# Patient Record
Sex: MALE | Race: WHITE | NOT HISPANIC OR LATINO | Employment: UNEMPLOYED | URBAN - METROPOLITAN AREA
[De-identification: names, ages, dates, MRNs, and addresses within clinical notes are randomized per-mention and may not be internally consistent; named-entity substitution may affect disease eponyms.]

---
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
## 2023-01-19 ENCOUNTER — TELEPHONE (OUTPATIENT)
Dept: ONCOLOGY | Facility: CLINIC | Age: 18
End: 2023-01-19
Payer: COMMERCIAL

## 2023-01-19 NOTE — TELEPHONE ENCOUNTER
Caller: FARAZ MCCLAIN    Relationship to patient: [employer]    Best call back number: [phone]    Patient is needing: TO REQUEST INFORMATION FOR [facility] PROGRAM HE WAS REFERRED TO BY DR. CRUZ.

## 2023-01-19 NOTE — TELEPHONE ENCOUNTER
Reviewed patient chart and returned call to mother with the information that it is the HMR Program at [facility].   Phone number and extension number provided to mother.  She v/u.

## 2023-02-01 ENCOUNTER — OFFICE VISIT (OUTPATIENT)
Dept: ONCOLOGY | Facility: CLINIC | Age: 18
End: 2023-02-01
Payer: COMMERCIAL

## 2023-02-01 ENCOUNTER — LAB (OUTPATIENT)
Dept: LAB | Facility: HOSPITAL | Age: 18
End: 2023-02-01
Payer: COMMERCIAL

## 2023-02-01 VITALS
WEIGHT: 315 LBS | HEART RATE: 96 BPM | HEIGHT: 74 IN | OXYGEN SATURATION: 97 % | RESPIRATION RATE: 18 BRPM | DIASTOLIC BLOOD PRESSURE: 86 MMHG | TEMPERATURE: 98 F | BODY MASS INDEX: 40.43 KG/M2 | SYSTOLIC BLOOD PRESSURE: 132 MMHG

## 2023-02-01 DIAGNOSIS — I82.4Y2 DVT, LOWER EXTREMITY, PROXIMAL, ACUTE, LEFT: Primary | ICD-10-CM

## 2023-02-01 DIAGNOSIS — I26.99 BILATERAL PULMONARY EMBOLISM: ICD-10-CM

## 2023-02-01 DIAGNOSIS — I82.4Y2 DVT, LOWER EXTREMITY, PROXIMAL, ACUTE, LEFT: ICD-10-CM

## 2023-02-01 DIAGNOSIS — I82.4Z2 DVT, LOWER EXTREMITY, DISTAL, ACUTE, LEFT: ICD-10-CM

## 2023-02-01 LAB
ALBUMIN SERPL-MCNC: 4.4 G/DL (ref 3.5–5.2)
ALBUMIN/GLOB SERPL: 1.5 G/DL (ref 1.1–2.4)
ALP SERPL-CCNC: 64 U/L (ref 38–116)
ALT SERPL W P-5'-P-CCNC: 21 U/L (ref 0–41)
ANION GAP SERPL CALCULATED.3IONS-SCNC: 11.9 MMOL/L (ref 5–15)
AST SERPL-CCNC: 13 U/L (ref 0–40)
BASOPHILS # BLD AUTO: 0.04 10*3/MM3 (ref 0–0.2)
BASOPHILS NFR BLD AUTO: 0.5 % (ref 0–1.5)
BILIRUB SERPL-MCNC: 0.4 MG/DL (ref 0.2–1.2)
BUN SERPL-MCNC: 17 MG/DL (ref 6–20)
BUN/CREAT SERPL: 22.4 (ref 7.3–30)
CALCIUM SPEC-SCNC: 9.6 MG/DL (ref 8.5–10.2)
CHLORIDE SERPL-SCNC: 103 MMOL/L (ref 98–107)
CO2 SERPL-SCNC: 25.1 MMOL/L (ref 22–29)
CREAT SERPL-MCNC: 0.76 MG/DL (ref 0.7–1.3)
DEPRECATED RDW RBC AUTO: 42.1 FL (ref 37–54)
EGFRCR SERPLBLD CKD-EPI 2021: 132.8 ML/MIN/1.73
EOSINOPHIL # BLD AUTO: 0.16 10*3/MM3 (ref 0–0.4)
EOSINOPHIL NFR BLD AUTO: 2 % (ref 0.3–6.2)
ERYTHROCYTE [DISTWIDTH] IN BLOOD BY AUTOMATED COUNT: 13.4 % (ref 12.3–15.4)
GLOBULIN UR ELPH-MCNC: 3 GM/DL (ref 1.8–3.5)
GLUCOSE SERPL-MCNC: 90 MG/DL (ref 74–124)
HCT VFR BLD AUTO: 45.9 % (ref 37.5–51)
HGB BLD-MCNC: 14.5 G/DL (ref 13–17.7)
IMM GRANULOCYTES # BLD AUTO: 0.02 10*3/MM3 (ref 0–0.05)
IMM GRANULOCYTES NFR BLD AUTO: 0.2 % (ref 0–0.5)
INR PPP: 1.1 (ref 0.9–1.1)
LYMPHOCYTES # BLD AUTO: 3.56 10*3/MM3 (ref 0.7–3.1)
LYMPHOCYTES NFR BLD AUTO: 44.4 % (ref 19.6–45.3)
MCH RBC QN AUTO: 27.3 PG (ref 26.6–33)
MCHC RBC AUTO-ENTMCNC: 31.6 G/DL (ref 31.5–35.7)
MCV RBC AUTO: 86.3 FL (ref 79–97)
MONOCYTES # BLD AUTO: 0.71 10*3/MM3 (ref 0.1–0.9)
MONOCYTES NFR BLD AUTO: 8.9 % (ref 5–12)
NEUTROPHILS NFR BLD AUTO: 3.52 10*3/MM3 (ref 1.7–7)
NEUTROPHILS NFR BLD AUTO: 44 % (ref 42.7–76)
NRBC BLD AUTO-RTO: 0 /100 WBC (ref 0–0.2)
PLATELET # BLD AUTO: 326 10*3/MM3 (ref 140–450)
PMV BLD AUTO: 9.6 FL (ref 6–12)
POTASSIUM SERPL-SCNC: 4.3 MMOL/L (ref 3.5–4.7)
PROT SERPL-MCNC: 7.4 G/DL (ref 6.3–8)
PROTHROMBIN TIME: 13.5 SECONDS (ref 11–13.5)
RBC # BLD AUTO: 5.32 10*6/MM3 (ref 4.14–5.8)
SODIUM SERPL-SCNC: 140 MMOL/L (ref 134–145)
WBC NRBC COR # BLD: 8.01 10*3/MM3 (ref 3.4–10.8)

## 2023-02-01 PROCEDURE — 80053 COMPREHEN METABOLIC PANEL: CPT

## 2023-02-01 PROCEDURE — 85610 PROTHROMBIN TIME: CPT

## 2023-02-01 PROCEDURE — 99214 OFFICE O/P EST MOD 30 MIN: CPT | Performed by: NURSE PRACTITIONER

## 2023-02-01 PROCEDURE — 85025 COMPLETE CBC W/AUTO DIFF WBC: CPT

## 2023-02-01 PROCEDURE — 36415 COLL VENOUS BLD VENIPUNCTURE: CPT

## 2023-02-01 NOTE — PROGRESS NOTES
[facility]    CONSULTING IN BLOOD DISORDERS & CANCER      REASON FOR CONSULTATION/CHIEF COMPLAINT:     Evaluation and management for left leg DVT, IVC thrombus and bilateral pulmonary embolism                             REQUESTING PHYSICIAN: No ref. provider found  RECORDS OBTAINED:  Records of the patients history including those from the electronic medical record were reviewed and summarized in detail.    HISTORY OF PRESENT ILLNESS:    The patient is a 17 y.o. year old male  with no major medical history had presented to [facility] ER on 8/25/2022 with worsening left leg pain.  A Doppler in the ER noted extensive DVT tracking up the entire left lower extremity up to the IVC.  A CT angiogram of the chest/abdomen/pelvis/lower extremities noted small bilateral pulmonary thromboemboli, with normal RV/LV ratio.  The IVC inferior to the right renal vein filled with thrombus as are both common iliac veins, left internal and external iliac, common femoral, superficial and profunda femoral, and popliteal veins.  On the right, there is thrombus in the distal right common iliac vein.  There is also thrombus within the right superficial femoral and likely right popliteal vein.     Patient was started on anticoagulation with heparin drip and vascular surgery consulted.  Patient underwent bilateral lower extremity venous thrombectomy, IVC thrombectomy and venogram on 8/26/2022 by Dr. Juan.  Hematology has been consulted for further assistance in management.     Patient denies any personal or family history of thrombosis.  He is morbidly obese, BMI 44.7.  Says he usually has sedentary lifestyle.  Lives with his parents.  Spends most of the time sitting/playing video games.    Given his elevated BMI, plan made to start therapeutic anticoagulation with Coumadin as outpatient, goal INR 2-3.    Hypercoagulable work-up performed in August 2022: Negative for factor V Leiden mutation, prothrombin gene mutation.  Low  protein C and Antithrombin 3 level (likely acute thrombosis and anticoagulation related).  Normal protein S level.    December 2022: Having difficulty maintaining therapeutic INR.  Switched back to Xarelto.    INTERIM HISTORY:  Patient returns 2/1/2023 and sure interval follow-up having switched back to Xarelto in December secondary to inability to maintain at goal on Coumadin.  Patient denies any difficulty remembering his Xarelto however the pharmacy dispensed a second starter pack and he therefore has been taking 15 mg twice daily once again instead of the 20 mg once daily.  Patient states he thought that was incorrect however the pharmacy stated that is what was prescribed however the instructions were to start with the twice daily x21 days and then 20 mg once daily thereafter.  Aside from this, he denies any bleeding, dark stools, unilateral edema, or shortness of breath or pain with deep breath.  He did call about the HMR program at [facility] however found this to be too expensive.  He does have an upcoming appointment with his primary care and will discuss options for weight loss with her.  He is also adjusting his dietary intake, trading out some processed foods for more whole foods as we discussed today.  He has been helping his family with a renovation recently and has remained active with this.  He reports when he is not doing that, he does walk a couple of times per day.  He has not been using the compression stockings and we discussed the importance of this.    Past Medical History:   Diagnosis Date   • Asthma    • History of blood clots 2022    multiple clots of legs, groin, abd, lungs   • History of DVT (deep vein thrombosis) 2022   • History of pulmonary embolism 2022     Past Surgical History:   Procedure Laterality Date   • LYTIC THROMBIN THERAPY Bilateral 8/26/2022    Procedure: BILATERAL LOWER EXTREMITY VENOUS THROMBECTOMY, INFERIOR VENA CAVA THROMBECTOMY, VENOGRAM;  Surgeon: Ita Juan,  "MD;  Location: [facility] 18/19;  Service: Vascular;  Laterality: Bilateral;       MEDICATIONS    Current Outpatient Medications:   •  rivaroxaban (XARELTO) 20 MG tablet, Take 1 tablet by mouth Daily With Dinner., Disp: 90 tablet, Rfl: 1    ALLERGIES:   No Known Allergies    SOCIAL HISTORY:       Social History     Socioeconomic History   • Marital status: Single   Tobacco Use   • Smoking status: Never   • Smokeless tobacco: Never   Vaping Use   • Vaping Use: Never used   Substance and Sexual Activity   • Alcohol use: Never   • Drug use: Never   • Sexual activity: Defer         FAMILY HISTORY:  Family History   Problem Relation Age of Onset   • Hyperlipidemia Father        REVIEW OF SYSTEMS:  Constitutional: [No fevers, chills, sweats]  Eye: [No recent visual problems]  ENMT: [No ear pain, nasal congestion, sore throat]  Respiratory: [No shortness of breath, cough]  Cardiovascular: [No Chest pain, palpitations, syncope]  Gastrointestinal: [No nausea, vomiting, diarrhea]  Genitourinary: [No hematuria]  Hema/Lymph: [Negative for bruising tendency, swollen lymph glands]  Endocrine: [Negative for excessive thirst, excessive hunger]  Musculoskeletal: [Denies any musculoskeletal pain or swelling]  Integumentary: [No rash, pruritus, abrasions]  Neurologic: [ No weakness or numbness, Alert & oriented X 4]        Vitals:    02/01/23 0942   BP: 132/86   Pulse: 96   Resp: 18   Temp: 98 °F (36.7 °C)   TempSrc: Temporal   SpO2: 97%   Weight: (!) 160 kg (352 lb 1.6 oz)   Height: 188 cm (74.02\")   PainSc: 0-No pain     Current Status 2/1/2023   ECOG score 0      PHYSICAL EXAM:    CONSTITUTIONAL:  Vital signs reviewed.  No distress, looks comfortable.  EYES:  Conjunctiva and lids unremarkable.   EARS,NOSE,MOUTH,THROAT:  Ears and nose appear unremarkable.    RESPIRATORY:  Normal respiratory effort.  Lungs clear to auscultation bilaterally.  CARDIOVASCULAR:  Normal S1, S2.  No murmurs rubs or gallops.  No significant lower " extremity edema.  GASTROINTESTINAL: Abdomen appears unremarkable.  Obese abdomen LYMPHATIC:  No cervical, supraclavicular lymphadenopathy.  NEURO: AAOx3, no focal deficits.  Appears to have equal strength all 4 extremities.  MUSCULOSKELETAL:  Unremarkable digits/nails.  No cyanosis or clubbing.  No apparent joint deformities.  SKIN:  Warm.  No rashes  PSYCHIATRIC:  Normal judgment and insight.  Normal mood and affect.     RECENT LABS:        Lab on 02/01/2023   Component Date Value Ref Range Status   • Protime 02/01/2023 13.5  11.0 - 13.5 Seconds Final   • INR 02/01/2023 1.10  0.90 - 1.10 Final   • WBC 02/01/2023 8.01  3.40 - 10.80 10*3/mm3 Final   • RBC 02/01/2023 5.32  4.14 - 5.80 10*6/mm3 Final   • Hemoglobin 02/01/2023 14.5  13.0 - 17.7 g/dL Final   • Hematocrit 02/01/2023 45.9  37.5 - 51.0 % Final   • MCV 02/01/2023 86.3  79.0 - 97.0 fL Final   • MCH 02/01/2023 27.3  26.6 - 33.0 pg Final   • MCHC 02/01/2023 31.6  31.5 - 35.7 g/dL Final   • RDW 02/01/2023 13.4  12.3 - 15.4 % Final   • RDW-SD 02/01/2023 42.1  37.0 - 54.0 fl Final   • MPV 02/01/2023 9.6  6.0 - 12.0 fL Final   • Platelets 02/01/2023 326  140 - 450 10*3/mm3 Final   • Neutrophil % 02/01/2023 44.0  42.7 - 76.0 % Final   • Lymphocyte % 02/01/2023 44.4  19.6 - 45.3 % Final   • Monocyte % 02/01/2023 8.9  5.0 - 12.0 % Final   • Eosinophil % 02/01/2023 2.0  0.3 - 6.2 % Final   • Basophil % 02/01/2023 0.5  0.0 - 1.5 % Final   • Immature Grans % 02/01/2023 0.2  0.0 - 0.5 % Final   • Neutrophils, Absolute 02/01/2023 3.52  1.70 - 7.00 10*3/mm3 Final   • Lymphocytes, Absolute 02/01/2023 3.56 (H)  0.70 - 3.10 10*3/mm3 Final   • Monocytes, Absolute 02/01/2023 0.71  0.10 - 0.90 10*3/mm3 Final   • Eosinophils, Absolute 02/01/2023 0.16  0.00 - 0.40 10*3/mm3 Final   • Basophils, Absolute 02/01/2023 0.04  0.00 - 0.20 10*3/mm3 Final   • Immature Grans, Absolute 02/01/2023 0.02  0.00 - 0.05 10*3/mm3 Final   • nRBC 02/01/2023 0.0  0.0 - 0.2 /100 WBC Final        ASSESSMENT:  Mr. Elizabeth is a pleasant 17-year-old male.     # Bilateral lower extremity DVT, bilateral pulmonary embolism, pelvic veins and IVC thrombus:  · Patient had with presented with significant thrombosis involving lungs,  bilateral lower extremity, pelvic veins and IVC in end of August 2022.  He underwent bilateral lower extremity venous thrombectomy, IVC thrombectomy and venogram on 8/26/2022 by Dr. Juan.  This massive thrombus appears to be unprovoked, however certainly contributed by morbid obesity and sedentary lifestyle.  No personal or family history of thrombosis.   · A hypercoagulable work-up was negative for factor V Leiden mutation, prothrombin gene mutation and antiphospholipid syndrome.  Normal protein S level.  Low protein C and Antithrombin 3 levels (likely thrombus and anticoagulation related)  · Given his BMI, a DOAC was not considered an ideal option.  Patient was started on therapeutic anticoagulation with Coumadin.  Goal INR 2-3.  · 12/9/2022: Patient continues to have difficulty maintaining therapeutic INR.  Reviewed recent data from multiple meta-analysis which show comparable efficacy of DOAC's in obese patients as well.  (Comparative efectiveness and safety of direct oral anticoagulants compared to warfarin in morbidly obese patients with acute venous thromboembolism: systematic review and a meta-analysis, Journal of thrombosis and thrombolysis, June 2020; [place]-Analysis Comparing Direct Oral Anticoagulants Versus Warfarin in Morbidly Obese Patients With Atrial Fibrillation, American Journal of cardiology, 2020)  · Discussed plan to stop Coumadin and switch to Xarelto.  With his subtherapeutic INR, will plan to start Xarelto 15 mg twice daily x3 weeks followed by 20 mg daily.  · 2/1/2023 patient returns for short interval follow-up as he was switched to Xarelto at the last office visit due to difficulty with INR maintenance.  Patient reports continuing the Xarelto without any  interruption in the interim.  Unfortunately however his pharmacy dispensed a second starter pack so has been taking the 50 mg twice daily once again.  We discussed that he needs to be taking the 20 mg once daily instead and he had tried to correct his pharmacy on this however they did not call the office for clarification.  Anyhow, new prescription is sent to his pharmacy today and the patient will move to 20 mg once daily dosing.  He has had no signs of bleeding and no new edema.  We discussed maintaining routine physical activity and wearing compression stockings as he is not currently doing so.  The patient has gained some weight and reports that he did try calling HMR but does not find this financially feasible due to the cost.  He does have an upcoming appointment with his primary care however will discuss options and weight loss with them.  In the meantime he is focusing on changing some of his dietary choices such as swapping out processed foods such as chips for fruit or vegetables for snacks.  Patient will continue to do this and will call us for any new concerns with regards to Xarelto.  Otherwise we will see him back in 3 months.    #Bilateral lower extremity swelling: Likely lymphedema/thrombosis related.  Will prescribe compression stockings as per patient's request.  Advised to maintain close follow-up with vascular surgery/lymphedema clinic.    #Morbid obesity, BMI 44.7:  Patient's (Body mass index is 45.19 kg/m².) indicates that they are morbidly obese (BMI > 40 or > 35 with obesity - related health condition) with health related conditions that include none . Weight is improving with treatment. BMI is is above average; no BMI management plan is appropriate. We discussed portion control and increasing exercise.   Referred to [facility] weight loss program however this is not financially feasible for the patient.  He has been discussing with his mom making dietary changes such as swapping out  processed snacks like chips for more whole based foods such as fruits or vegetables.  He also has discussed rotating certain meals such as baked chicken and salad for dinner with sandwiches for lunch.  He states his family does order food in fairly frequently.  He does have an upcoming appointment with his primary care as well at which time he plans to discuss weight loss options.    PLAN:  -Massive DVT, PE and abdominal veins thrombosis.  Unprovoked.  Hypercoagulable work-up negative.  Will likely need long-term anticoagulation.  -New prescription for Xarelto 20 mg once daily sent to pharmacy, patient to transition to the 20 mg once daily dose  -Encouraged use of compression stockings  -Advised close follow-up with vascular surgery  -Patient to discuss weight loss options with primary care and make dietary adjustments on own  -Follow-up in 3 months with Dr. Cates or sooner if needed  No orders of the defined types were placed in this encounter.  Total time spent during this patient encounter is 45 minutes. The total time spent with the patient includes at least one or more of the following: preparing to see the patient by reviewing of tests, prior notes or other relevant information, performing appropriate independent examination & evaluation, counseling, ordering of medications, tests or procedures, communicating with other healthcare professionals, when appropriate to coordinate care, documenting clinic information in the electronic medical records or other health records, independently interpreting results of tests and communicating the results to the patient/family or caregiver.

## 2023-05-02 ENCOUNTER — LAB (OUTPATIENT)
Dept: LAB | Facility: HOSPITAL | Age: 18
End: 2023-05-02
Payer: COMMERCIAL

## 2023-05-02 ENCOUNTER — OFFICE VISIT (OUTPATIENT)
Dept: ONCOLOGY | Facility: CLINIC | Age: 18
End: 2023-05-02
Payer: COMMERCIAL

## 2023-05-02 VITALS
HEART RATE: 116 BPM | OXYGEN SATURATION: 96 % | WEIGHT: 315 LBS | DIASTOLIC BLOOD PRESSURE: 80 MMHG | HEIGHT: 74 IN | TEMPERATURE: 99.1 F | SYSTOLIC BLOOD PRESSURE: 123 MMHG | BODY MASS INDEX: 40.43 KG/M2

## 2023-05-02 DIAGNOSIS — I26.99 BILATERAL PULMONARY EMBOLISM: ICD-10-CM

## 2023-05-02 DIAGNOSIS — I82.4Y2 DVT, LOWER EXTREMITY, PROXIMAL, ACUTE, LEFT: ICD-10-CM

## 2023-05-02 DIAGNOSIS — I82.4Y2 DVT, LOWER EXTREMITY, PROXIMAL, ACUTE, LEFT: Primary | ICD-10-CM

## 2023-05-02 DIAGNOSIS — I82.4Z2 DVT, LOWER EXTREMITY, DISTAL, ACUTE, LEFT: ICD-10-CM

## 2023-05-02 LAB
BASOPHILS # BLD AUTO: 0.05 10*3/MM3 (ref 0–0.2)
BASOPHILS NFR BLD AUTO: 0.5 % (ref 0–1.5)
DEPRECATED RDW RBC AUTO: 38 FL (ref 37–54)
EOSINOPHIL # BLD AUTO: 0.17 10*3/MM3 (ref 0–0.4)
EOSINOPHIL NFR BLD AUTO: 1.9 % (ref 0.3–6.2)
ERYTHROCYTE [DISTWIDTH] IN BLOOD BY AUTOMATED COUNT: 12.5 % (ref 12.3–15.4)
HCT VFR BLD AUTO: 46.2 % (ref 37.5–51)
HGB BLD-MCNC: 15.8 G/DL (ref 13–17.7)
IMM GRANULOCYTES # BLD AUTO: 0.04 10*3/MM3 (ref 0–0.05)
IMM GRANULOCYTES NFR BLD AUTO: 0.4 % (ref 0–0.5)
INR PPP: 1.1 (ref 0.9–1.1)
LYMPHOCYTES # BLD AUTO: 3.71 10*3/MM3 (ref 0.7–3.1)
LYMPHOCYTES NFR BLD AUTO: 40.4 % (ref 19.6–45.3)
MCH RBC QN AUTO: 28.8 PG (ref 26.6–33)
MCHC RBC AUTO-ENTMCNC: 34.2 G/DL (ref 31.5–35.7)
MCV RBC AUTO: 84.2 FL (ref 79–97)
MONOCYTES # BLD AUTO: 0.71 10*3/MM3 (ref 0.1–0.9)
MONOCYTES NFR BLD AUTO: 7.7 % (ref 5–12)
NEUTROPHILS NFR BLD AUTO: 4.5 10*3/MM3 (ref 1.7–7)
NEUTROPHILS NFR BLD AUTO: 49.1 % (ref 42.7–76)
NRBC BLD AUTO-RTO: 0 /100 WBC (ref 0–0.2)
PLATELET # BLD AUTO: 313 10*3/MM3 (ref 140–450)
PMV BLD AUTO: 9.8 FL (ref 6–12)
PROTHROMBIN TIME: 13 SECONDS (ref 11–13.5)
RBC # BLD AUTO: 5.49 10*6/MM3 (ref 4.14–5.8)
WBC NRBC COR # BLD: 9.18 10*3/MM3 (ref 3.4–10.8)

## 2023-05-02 PROCEDURE — 85025 COMPLETE CBC W/AUTO DIFF WBC: CPT

## 2023-05-02 PROCEDURE — 36416 COLLJ CAPILLARY BLOOD SPEC: CPT

## 2023-05-02 PROCEDURE — 85610 PROTHROMBIN TIME: CPT

## 2023-05-02 NOTE — PROGRESS NOTES
[facility]    CONSULTING IN BLOOD DISORDERS & CANCER      REASON FOR CONSULTATION/CHIEF COMPLAINT:     Evaluation and management for left leg DVT, IVC thrombus and bilateral pulmonary embolism                             REQUESTING PHYSICIAN: No ref. provider found  RECORDS OBTAINED:  Records of the patients history including those from the electronic medical record were reviewed and summarized in detail.    HISTORY OF PRESENT ILLNESS:    The patient is a 18 y.o. year old male  with no major medical history had presented to [facility] ER on 8/25/2022 with worsening left leg pain.  A Doppler in the ER noted extensive DVT tracking up the entire left lower extremity up to the IVC.  A CT angiogram of the chest/abdomen/pelvis/lower extremities noted small bilateral pulmonary thromboemboli, with normal RV/LV ratio.  The IVC inferior to the right renal vein filled with thrombus as are both common iliac veins, left internal and external iliac, common femoral, superficial and profunda femoral, and popliteal veins.  On the right, there is thrombus in the distal right common iliac vein.  There is also thrombus within the right superficial femoral and likely right popliteal vein.     Patient was started on anticoagulation with heparin drip and vascular surgery consulted.  Patient underwent bilateral lower extremity venous thrombectomy, IVC thrombectomy and venogram on 8/26/2022 by Dr. Juan.  Hematology has been consulted for further assistance in management.     Patient denies any personal or family history of thrombosis.  He is morbidly obese, BMI 44.7.  Says he usually has sedentary lifestyle.  Lives with his parents.  Spends most of the time sitting/playing video games.    Given his elevated BMI, plan made to start therapeutic anticoagulation with Coumadin as outpatient, goal INR 2-3.    Hypercoagulable work-up performed in August 2022: Negative for factor V Leiden mutation, prothrombin gene mutation.  Low  protein C and Antithrombin 3 level (likely acute thrombosis and anticoagulation related).  Normal protein S level.    December 2022: Having difficulty maintaining therapeutic INR.  Switched back to Xarelto.    INTERIM HISTORY:  Patient returns to the clinic for a follow-up visit.  He remains on Xarelto 20 mg daily.  Tolerating it well.  No excessive bruises or bleeding.  Weight overall stable.  He did call about the HMR program at [facility] however found this to be too expensive.  He he has an upcoming appointment with endocrinology to discuss weight loss management.  He does walk a couple of times per day.  He has not been using the compression stockings and we discussed the importance of this.    Past Medical History:   Diagnosis Date   • Asthma    • History of blood clots 2022    multiple clots of legs, groin, abd, lungs   • History of DVT (deep vein thrombosis) 2022   • History of pulmonary embolism 2022     Past Surgical History:   Procedure Laterality Date   • LYTIC THROMBIN THERAPY Bilateral 8/26/2022    Procedure: BILATERAL LOWER EXTREMITY VENOUS THROMBECTOMY, INFERIOR VENA CAVA THROMBECTOMY, VENOGRAM;  Surgeon: Ita Juan MD;  Location: [facility] 18/19;  Service: Vascular;  Laterality: Bilateral;       MEDICATIONS    Current Outpatient Medications:   •  rivaroxaban (XARELTO) 20 MG tablet, Take 1 tablet by mouth Daily With Dinner., Disp: 90 tablet, Rfl: 1    ALLERGIES:   No Known Allergies    SOCIAL HISTORY:       Social History     Socioeconomic History   • Marital status: Single   Tobacco Use   • Smoking status: Never   • Smokeless tobacco: Never   Vaping Use   • Vaping Use: Never used   Substance and Sexual Activity   • Alcohol use: Never   • Drug use: Never   • Sexual activity: Defer         FAMILY HISTORY:  Family History   Problem Relation Age of Onset   • Hyperlipidemia Father        REVIEW OF SYSTEMS:  As per HPI        Vitals:    05/02/23 1330   BP: 123/80   Pulse: 116   Temp: 99.1  "°F (37.3 °C)   TempSrc: Temporal   SpO2: 96%   Weight: (!) 160 kg (353 lb)   Height: 188 cm (74.02\")   PainSc: 0-No pain         5/2/2023     1:29 PM   Current Status   ECOG score 0      PHYSICAL EXAM:    CONSTITUTIONAL:  Vital signs reviewed.  No distress, looks comfortable.  EYES:  Conjunctiva and lids unremarkable.   EARS,NOSE,MOUTH,THROAT:  Ears and nose appear unremarkable.    RESPIRATORY:  Normal respiratory effort.  Lungs clear to auscultation bilaterally.  CARDIOVASCULAR:  Normal S1, S2.  No murmurs rubs or gallops.  No significant lower extremity edema.  GASTROINTESTINAL: Abdomen appears unremarkable.  Obese abdomen  LYMPHATIC:  No cervical, supraclavicular lymphadenopathy.  NEURO: AAOx3, no focal deficits.  Appears to have equal strength all 4 extremities.  MUSCULOSKELETAL:  Unremarkable digits/nails.  No cyanosis or clubbing.  No apparent joint deformities.  SKIN:  Warm.  No rashes  PSYCHIATRIC:  Normal judgment and insight.  Normal mood and affect.     RECENT LABS:        Lab on 05/02/2023   Component Date Value Ref Range Status   • Protime 05/02/2023 13.0  11.0 - 13.5 Seconds Final   • INR 05/02/2023 1.10  0.90 - 1.10 Final   • WBC 05/02/2023 9.18  3.40 - 10.80 10*3/mm3 Final   • RBC 05/02/2023 5.49  4.14 - 5.80 10*6/mm3 Final   • Hemoglobin 05/02/2023 15.8  13.0 - 17.7 g/dL Final   • Hematocrit 05/02/2023 46.2  37.5 - 51.0 % Final   • MCV 05/02/2023 84.2  79.0 - 97.0 fL Final   • MCH 05/02/2023 28.8  26.6 - 33.0 pg Final   • MCHC 05/02/2023 34.2  31.5 - 35.7 g/dL Final   • RDW 05/02/2023 12.5  12.3 - 15.4 % Final   • RDW-SD 05/02/2023 38.0  37.0 - 54.0 fl Final   • MPV 05/02/2023 9.8  6.0 - 12.0 fL Final   • Platelets 05/02/2023 313  140 - 450 10*3/mm3 Final   • Neutrophil % 05/02/2023 49.1  42.7 - 76.0 % Final   • Lymphocyte % 05/02/2023 40.4  19.6 - 45.3 % Final   • Monocyte % 05/02/2023 7.7  5.0 - 12.0 % Final   • Eosinophil % 05/02/2023 1.9  0.3 - 6.2 % Final   • Basophil % 05/02/2023 0.5  0.0 - " 1.5 % Final   • Immature Grans % 05/02/2023 0.4  0.0 - 0.5 % Final   • Neutrophils, Absolute 05/02/2023 4.50  1.70 - 7.00 10*3/mm3 Final   • Lymphocytes, Absolute 05/02/2023 3.71 (H)  0.70 - 3.10 10*3/mm3 Final   • Monocytes, Absolute 05/02/2023 0.71  0.10 - 0.90 10*3/mm3 Final   • Eosinophils, Absolute 05/02/2023 0.17  0.00 - 0.40 10*3/mm3 Final   • Basophils, Absolute 05/02/2023 0.05  0.00 - 0.20 10*3/mm3 Final   • Immature Grans, Absolute 05/02/2023 0.04  0.00 - 0.05 10*3/mm3 Final   • nRBC 05/02/2023 0.0  0.0 - 0.2 /100 WBC Final       ASSESSMENT:  Mr. Elizabeth is a pleasant 17-year-old male.     # Bilateral lower extremity DVT, bilateral pulmonary embolism, pelvic veins and IVC thrombus:  · Patient had with presented with significant thrombosis involving lungs,  bilateral lower extremity, pelvic veins and IVC in end of August 2022.  He underwent bilateral lower extremity venous thrombectomy, IVC thrombectomy and venogram on 8/26/2022 by Dr. Juan.  This massive thrombus appears to be unprovoked, however certainly contributed by morbid obesity and sedentary lifestyle.  No personal or family history of thrombosis.   · A hypercoagulable work-up was negative for factor V Leiden mutation, prothrombin gene mutation and antiphospholipid syndrome.  Normal protein S level.  Low protein C and Antithrombin 3 levels (likely thrombus and anticoagulation related)  · Given his BMI, a DOAC was not considered an ideal option.  Patient was started on therapeutic anticoagulation with Coumadin.  Goal INR 2-3.  · 12/9/2022: Patient continues to have difficulty maintaining therapeutic INR.  Reviewed recent data from multiple meta-analysis which show comparable efficacy of DOAC's in obese patients as well.  (Comparative efectiveness and safety of direct oral anticoagulants compared to warfarin in morbidly obese patients with acute venous thromboembolism: systematic review and a meta-analysis, Journal of thrombosis and thrombolysis,  June 2020; [place]-Analysis Comparing Direct Oral Anticoagulants Versus Warfarin in Morbidly Obese Patients With Atrial Fibrillation, American Journal of cardiology, 2020)  · Discussed plan to stop Coumadin and switch to Xarelto (Dec 2022)  · 5/2/2023: Continues to do well on Xarelto 20 mg daily.  No bleed or excessive bruises.  Labs stable.  Patient is working on reducing his weight.  Says he has an upcoming appointment with endocrinology next week.  Discussed plan to repeat bilateral lower extremity Doppler in 3-4 months and follow-up after that.    #Bilateral lower extremity swelling: Likely lymphedema/thrombosis related.  Recommended compression stockings.  Advised to maintain close follow-up with vascular surgery/lymphedema clinic.    #Morbid obesity, BMI 44.7:  Patient's (Body mass index is 45.3 kg/m².) indicates that they are morbidly obese (BMI > 40 or > 35 with obesity - related health condition) with health related conditions that include lower extremity venous stasis disease . Weight is unchanged. BMI is is above average; no BMI management plan is appropriate. We discussed portion control and increasing exercise.   He has an upcoming appointment with endocrinology to discuss weight loss medications.    PLAN:  -Massive DVT, PE and abdominal veins thrombosis.  Unprovoked.  Hypercoagulable work-up negative.  Will likely need long-term anticoagulation.  -Continue Xarelto 20 mg daily  -Encouraged use of compression stockings  -Advised close follow-up with vascular surgery  -Repeat bilateral lower extremity Dopplers in 3-4 months  -Follow-up in 3 months  or sooner if needed  Orders Placed This Encounter   Procedures   • CBC & Differential     Standing Status:   Future     Number of Occurrences:   1     Standing Expiration Date:   5/1/2024     Order Specific Question:   Manual Differential     Answer:   No   Total time spent during this patient encounter is 45 minutes. The total time spent with the patient includes  at least one or more of the following: preparing to see the patient by reviewing of tests, prior notes or other relevant information, performing appropriate independent examination & evaluation, counseling, ordering of medications, tests or procedures, communicating with other healthcare professionals, when appropriate to coordinate care, documenting clinic information in the electronic medical records or other health records, independently interpreting results of tests and communicating the results to the patient/family or caregiver.

## 2023-09-05 ENCOUNTER — HOSPITAL ENCOUNTER (OUTPATIENT)
Dept: CARDIOLOGY | Facility: HOSPITAL | Age: 18
Discharge: HOME | End: 2023-09-05
Admitting: INTERNAL MEDICINE
Payer: COMMERCIAL

## 2023-09-05 DIAGNOSIS — I82.4Y2 DVT, LOWER EXTREMITY, PROXIMAL, ACUTE, LEFT: ICD-10-CM

## 2023-09-05 LAB
BH CV LOW VAS LEFT COMMON FEMORAL SPONT: 1
BH CV LOW VAS LEFT MID FEMORAL SPONT: 1
BH CV LOW VAS LEFT POPLITEAL SPONT: 1
BH CV LOWER VASCULAR LEFT COMMON FEMORAL AUGMENT: NORMAL
BH CV LOWER VASCULAR LEFT COMMON FEMORAL COMPETENT: NORMAL
BH CV LOWER VASCULAR LEFT COMMON FEMORAL COMPRESS: NORMAL
BH CV LOWER VASCULAR LEFT COMMON FEMORAL PHASIC: NORMAL
BH CV LOWER VASCULAR LEFT COMMON FEMORAL SPONT: NORMAL
BH CV LOWER VASCULAR LEFT COMMON FEMORAL THROMBUS: NORMAL
BH CV LOWER VASCULAR LEFT DISTAL FEMORAL COMPRESS: NORMAL
BH CV LOWER VASCULAR LEFT GASTRONEMIUS COMPRESS: NORMAL
BH CV LOWER VASCULAR LEFT GREATER SAPH AK COMPRESS: NORMAL
BH CV LOWER VASCULAR LEFT GREATER SAPH BK COMPRESS: NORMAL
BH CV LOWER VASCULAR LEFT LESSER SAPH COMPRESS: NORMAL
BH CV LOWER VASCULAR LEFT MID FEMORAL AUGMENT: NORMAL
BH CV LOWER VASCULAR LEFT MID FEMORAL COMPETENT: NORMAL
BH CV LOWER VASCULAR LEFT MID FEMORAL COMPRESS: NORMAL
BH CV LOWER VASCULAR LEFT MID FEMORAL PHASIC: NORMAL
BH CV LOWER VASCULAR LEFT MID FEMORAL SPONT: NORMAL
BH CV LOWER VASCULAR LEFT MID FEMORAL THROMBUS: NORMAL
BH CV LOWER VASCULAR LEFT PERONEAL COMPRESS: NORMAL
BH CV LOWER VASCULAR LEFT POPLITEAL AUGMENT: NORMAL
BH CV LOWER VASCULAR LEFT POPLITEAL COMPETENT: NORMAL
BH CV LOWER VASCULAR LEFT POPLITEAL COMPRESS: NORMAL
BH CV LOWER VASCULAR LEFT POPLITEAL PHASIC: NORMAL
BH CV LOWER VASCULAR LEFT POPLITEAL SPONT: NORMAL
BH CV LOWER VASCULAR LEFT POPLITEAL THROMBUS: NORMAL
BH CV LOWER VASCULAR LEFT POSTERIOR TIBIAL COMPRESS: NORMAL
BH CV LOWER VASCULAR LEFT PROFUNDA FEMORAL COMPRESS: NORMAL
BH CV LOWER VASCULAR LEFT PROXIMAL FEMORAL COMPRESS: NORMAL
BH CV LOWER VASCULAR LEFT SAPHENOFEMORAL JUNCTION COMPRESS: NORMAL
BH CV LOWER VASCULAR RIGHT COMMON FEMORAL AUGMENT: NORMAL
BH CV LOWER VASCULAR RIGHT COMMON FEMORAL COMPETENT: NORMAL
BH CV LOWER VASCULAR RIGHT COMMON FEMORAL COMPRESS: NORMAL
BH CV LOWER VASCULAR RIGHT COMMON FEMORAL PHASIC: NORMAL
BH CV LOWER VASCULAR RIGHT COMMON FEMORAL SPONT: NORMAL
BH CV LOWER VASCULAR RIGHT DISTAL FEMORAL COMPRESS: NORMAL
BH CV LOWER VASCULAR RIGHT GASTRONEMIUS COMPRESS: NORMAL
BH CV LOWER VASCULAR RIGHT GREATER SAPH AK COMPRESS: NORMAL
BH CV LOWER VASCULAR RIGHT GREATER SAPH BK COMPRESS: NORMAL
BH CV LOWER VASCULAR RIGHT LESSER SAPH COMPRESS: NORMAL
BH CV LOWER VASCULAR RIGHT MID FEMORAL AUGMENT: NORMAL
BH CV LOWER VASCULAR RIGHT MID FEMORAL COMPETENT: NORMAL
BH CV LOWER VASCULAR RIGHT MID FEMORAL COMPRESS: NORMAL
BH CV LOWER VASCULAR RIGHT MID FEMORAL PHASIC: NORMAL
BH CV LOWER VASCULAR RIGHT MID FEMORAL SPONT: NORMAL
BH CV LOWER VASCULAR RIGHT PERONEAL COMPRESS: NORMAL
BH CV LOWER VASCULAR RIGHT POPLITEAL AUGMENT: NORMAL
BH CV LOWER VASCULAR RIGHT POPLITEAL COMPETENT: NORMAL
BH CV LOWER VASCULAR RIGHT POPLITEAL COMPRESS: NORMAL
BH CV LOWER VASCULAR RIGHT POPLITEAL PHASIC: NORMAL
BH CV LOWER VASCULAR RIGHT POPLITEAL SPONT: NORMAL
BH CV LOWER VASCULAR RIGHT POSTERIOR TIBIAL COMPRESS: NORMAL
BH CV LOWER VASCULAR RIGHT PROFUNDA FEMORAL COMPRESS: NORMAL
BH CV LOWER VASCULAR RIGHT PROXIMAL FEMORAL COMPRESS: NORMAL
BH CV LOWER VASCULAR RIGHT SAPHENOFEMORAL JUNCTION COMPRESS: NORMAL

## 2023-09-05 PROCEDURE — 93970 EXTREMITY STUDY: CPT

## 2023-09-08 ENCOUNTER — LAB (OUTPATIENT)
Dept: LAB | Facility: HOSPITAL | Age: 18
End: 2023-09-08
Payer: COMMERCIAL

## 2023-09-08 ENCOUNTER — OFFICE VISIT (OUTPATIENT)
Dept: ONCOLOGY | Facility: CLINIC | Age: 18
End: 2023-09-08
Payer: COMMERCIAL

## 2023-09-08 VITALS
HEIGHT: 74 IN | SYSTOLIC BLOOD PRESSURE: 123 MMHG | TEMPERATURE: 98.2 F | WEIGHT: 315 LBS | OXYGEN SATURATION: 98 % | DIASTOLIC BLOOD PRESSURE: 82 MMHG | RESPIRATION RATE: 16 BRPM | HEART RATE: 88 BPM | BODY MASS INDEX: 40.43 KG/M2

## 2023-09-08 DIAGNOSIS — I26.99 BILATERAL PULMONARY EMBOLISM: ICD-10-CM

## 2023-09-08 DIAGNOSIS — I82.4Y2 DVT, LOWER EXTREMITY, PROXIMAL, ACUTE, LEFT: Primary | ICD-10-CM

## 2023-09-08 DIAGNOSIS — I82.4Y2 DVT, LOWER EXTREMITY, PROXIMAL, ACUTE, LEFT: ICD-10-CM

## 2023-09-08 LAB
BASOPHILS # BLD AUTO: 0.04 10*3/MM3 (ref 0–0.2)
BASOPHILS NFR BLD AUTO: 0.5 % (ref 0–1.5)
DEPRECATED RDW RBC AUTO: 38.8 FL (ref 37–54)
EOSINOPHIL # BLD AUTO: 0.1 10*3/MM3 (ref 0–0.4)
EOSINOPHIL NFR BLD AUTO: 1.4 % (ref 0.3–6.2)
ERYTHROCYTE [DISTWIDTH] IN BLOOD BY AUTOMATED COUNT: 12.8 % (ref 12.3–15.4)
HCT VFR BLD AUTO: 45 % (ref 37.5–51)
HGB BLD-MCNC: 15.3 G/DL (ref 13–17.7)
IMM GRANULOCYTES # BLD AUTO: 0.11 10*3/MM3 (ref 0–0.05)
IMM GRANULOCYTES NFR BLD AUTO: 1.5 % (ref 0–0.5)
LYMPHOCYTES # BLD AUTO: 2.32 10*3/MM3 (ref 0.7–3.1)
LYMPHOCYTES NFR BLD AUTO: 31.7 % (ref 19.6–45.3)
MCH RBC QN AUTO: 28.8 PG (ref 26.6–33)
MCHC RBC AUTO-ENTMCNC: 34 G/DL (ref 31.5–35.7)
MCV RBC AUTO: 84.6 FL (ref 79–97)
MONOCYTES # BLD AUTO: 0.5 10*3/MM3 (ref 0.1–0.9)
MONOCYTES NFR BLD AUTO: 6.8 % (ref 5–12)
NEUTROPHILS NFR BLD AUTO: 4.24 10*3/MM3 (ref 1.7–7)
NEUTROPHILS NFR BLD AUTO: 58.1 % (ref 42.7–76)
NRBC BLD AUTO-RTO: 0 /100 WBC (ref 0–0.2)
PLATELET # BLD AUTO: 289 10*3/MM3 (ref 140–450)
PMV BLD AUTO: 9.7 FL (ref 6–12)
RBC # BLD AUTO: 5.32 10*6/MM3 (ref 4.14–5.8)
WBC NRBC COR # BLD: 7.31 10*3/MM3 (ref 3.4–10.8)

## 2023-09-08 PROCEDURE — 85025 COMPLETE CBC W/AUTO DIFF WBC: CPT

## 2023-09-08 PROCEDURE — 36415 COLL VENOUS BLD VENIPUNCTURE: CPT

## 2023-09-08 NOTE — PROGRESS NOTES
[facility]    CONSULTING IN BLOOD DISORDERS & CANCER      REASON FOR CONSULTATION/CHIEF COMPLAINT:     Evaluation and management for left leg DVT, IVC thrombus and bilateral pulmonary embolism                             REQUESTING PHYSICIAN: No ref. provider found  RECORDS OBTAINED:  Records of the patients history including those from the electronic medical record were reviewed and summarized in detail.    HISTORY OF PRESENT ILLNESS:    The patient is a 18 y.o. year old male  with no major medical history had presented to [facility] ER on 8/25/2022 with worsening left leg pain.  A Doppler in the ER noted extensive DVT tracking up the entire left lower extremity up to the IVC.  A CT angiogram of the chest/abdomen/pelvis/lower extremities noted small bilateral pulmonary thromboemboli, with normal RV/LV ratio.  The IVC inferior to the right renal vein filled with thrombus as are both common iliac veins, left internal and external iliac, common femoral, superficial and profunda femoral, and popliteal veins.  On the right, there is thrombus in the distal right common iliac vein.  There is also thrombus within the right superficial femoral and likely right popliteal vein.     Patient was started on anticoagulation with heparin drip and vascular surgery consulted.  Patient underwent bilateral lower extremity venous thrombectomy, IVC thrombectomy and venogram on 8/26/2022 by Dr. Juan.  Hematology has been consulted for further assistance in management.     Patient denies any personal or family history of thrombosis.  He is morbidly obese, BMI 44.7.  Says he usually has sedentary lifestyle.  Lives with his parents.  Spends most of the time sitting/playing video games.    Given his elevated BMI, plan made to start therapeutic anticoagulation with Coumadin as outpatient, goal INR 2-3.    Hypercoagulable work-up performed in August 2022: Negative for factor V Leiden mutation, prothrombin gene mutation.  Low  protein C and Antithrombin 3 level (likely acute thrombosis and anticoagulation related).  Normal protein S level.    December 2022: Having difficulty maintaining therapeutic INR.  Switched back to Xarelto.    INTERIM HISTORY:  Patient returns to the clinic for a follow-up visit.  He remains on Xarelto 20 mg daily.  Tolerating it well.  No excessive bruises or bleeding.  Weight overall stable.  He has not been able to see endocrinology.  He does walk a couple of times per day.  Reports of having tightness and swelling in right lower extremity around ankles with excessive walking.  He has not been using the compression stockings and we discussed the importance of this.    Past Medical History:   Diagnosis Date    Asthma     History of blood clots 2022    multiple clots of legs, groin, abd, lungs    History of DVT (deep vein thrombosis) 2022    History of pulmonary embolism 2022     Past Surgical History:   Procedure Laterality Date    LYTIC THROMBIN THERAPY Bilateral 8/26/2022    Procedure: BILATERAL LOWER EXTREMITY VENOUS THROMBECTOMY, INFERIOR VENA CAVA THROMBECTOMY, VENOGRAM;  Surgeon: Ita Juan MD;  Location: [facility] 18/19;  Service: Vascular;  Laterality: Bilateral;       MEDICATIONS    Current Outpatient Medications:     rivaroxaban (XARELTO) 20 MG tablet, Take 1 tablet by mouth Daily With Dinner., Disp: 90 tablet, Rfl: 2    ALLERGIES:   No Known Allergies    SOCIAL HISTORY:       Social History     Socioeconomic History    Marital status: Single   Tobacco Use    Smoking status: Never    Smokeless tobacco: Never   Vaping Use    Vaping Use: Never used   Substance and Sexual Activity    Alcohol use: Never    Drug use: Never    Sexual activity: Defer         FAMILY HISTORY:  Family History   Problem Relation Age of Onset    Hyperlipidemia Father        REVIEW OF SYSTEMS:  As per HPI        Vitals:    09/08/23 1316   BP: 123/82   Pulse: 88   Resp: 16   Temp: 98.2 °F (36.8 °C)   TempSrc: Temporal  "  SpO2: 98%   Weight: (!) 162 kg (356 lb 3.2 oz)   Height: 188 cm (74.02\")   PainSc: 0-No pain         9/8/2023     1:19 PM   Current Status   ECOG score 0      PHYSICAL EXAM:    CONSTITUTIONAL:  Vital signs reviewed.  No distress, looks comfortable.  EYES:  Conjunctiva and lids unremarkable.   EARS,NOSE,MOUTH,THROAT:  Ears and nose appear unremarkable.    RESPIRATORY:  Normal respiratory effort.  Lungs clear to auscultation bilaterally.  CARDIOVASCULAR:  Normal S1, S2.  No murmurs rubs or gallops.  No significant lower extremity edema.  GASTROINTESTINAL: Abdomen appears unremarkable.  Obese abdomen  LYMPHATIC:  No cervical, supraclavicular lymphadenopathy.  NEURO: AAOx3, no focal deficits.  Appears to have equal strength all 4 extremities.  MUSCULOSKELETAL:  Unremarkable digits/nails.  No cyanosis or clubbing.  No apparent joint deformities.  SKIN:  Warm.  No rashes  PSYCHIATRIC:  Normal judgment and insight.  Normal mood and affect.     RECENT LABS:  Reviewed    ASSESSMENT:  Mr. Elizabeth is a pleasant 18-year-old male.     # Bilateral lower extremity DVT, bilateral pulmonary embolism, pelvic veins and IVC thrombus:  Patient had with presented with significant thrombosis involving lungs,  bilateral lower extremity, pelvic veins and IVC in end of August 2022.  He underwent bilateral lower extremity venous thrombectomy, IVC thrombectomy and venogram on 8/26/2022 by Dr. Juan.  This massive thrombus appears to be unprovoked, however certainly contributed by morbid obesity and sedentary lifestyle.  No personal or family history of thrombosis.   A hypercoagulable work-up was negative for factor V Leiden mutation, prothrombin gene mutation and antiphospholipid syndrome.  Normal protein S level.  Low protein C and Antithrombin 3 levels (likely thrombus and anticoagulation related)  Given his BMI, a DOAC was not considered an ideal option.  Patient was started on therapeutic anticoagulation with Coumadin.  Goal INR " 2-3.  12/9/2022: Patient continues to have difficulty maintaining therapeutic INR.  Reviewed recent data from multiple meta-analysis which show comparable efficacy of DOAC's in obese patients as well.  (Comparative efectiveness and safety of direct oral anticoagulants compared to warfarin in morbidly obese patients with acute venous thromboembolism: systematic review and a meta-analysis, Journal of thrombosis and thrombolysis, June 2020; [place]-Analysis Comparing Direct Oral Anticoagulants Versus Warfarin in Morbidly Obese Patients With Atrial Fibrillation, American Journal of cardiology, 2020)  Discussed plan to stop Coumadin and switch to Xarelto (Dec 2022)  9/8/2023: Continues to do well on Xarelto 20 mg daily.  No bleed or excessive bruises.  Recent Doppler legs from 9/5/2023 show chronic DVT in the left lower leg in the common femoral, mid femoral and popliteal veins.  All other veins appeared normal bilaterally.  Labs stable.  Patient is working on reducing his weight.  Referral to endocrinology placed today.  Discussed plan to continue Xarelto 20 mg daily for now.  Follow-up in 6 months with repeat labs.    #Bilateral lower extremity swelling: Likely lymphedema/thrombosis related.  Recommended compression stockings.  Advised to maintain close follow-up with vascular surgery/lymphedema clinic.    #Morbid obesity, BMI 44.7:  Patient's (Body mass index is 45.71 kg/m².) indicates that they are morbidly obese (BMI > 40 or > 35 with obesity - related health condition) with health related conditions that include lower extremity venous stasis disease . Weight is unchanged. BMI is is above average; no BMI management plan is appropriate. We discussed portion control and increasing exercise.   Refer to endocrinology made.    PLAN:  -Massive DVT, PE and abdominal veins thrombosis.  Unprovoked.  Hypercoagulable work-up negative.  Will need long-term anticoagulation.  -Continue Xarelto 20 mg daily  -Encouraged use of  compression stockings  -Advised close follow-up with vascular surgery  -Refer to endocrinology  -Follow-up in 6 months  or sooner if needed  Orders Placed This Encounter   Procedures    Comprehensive Metabolic Panel     Standing Status:   Future     Standing Expiration Date:   9/7/2024     Order Specific Question:   Release to patient     Answer:   Routine Release [[id number]]    Ambulatory Referral to Endocrinology     Referral Priority:   Routine     Referral Type:   Consultation     Referral Reason:   Specialty Services Required     Requested Specialty:   Endocrinology     Number of Visits Requested:   1    CBC & Differential     Standing Status:   Future     Standing Expiration Date:   9/7/2024     Order Specific Question:   Manual Differential     Answer:   No     Order Specific Question:   Release to patient     Answer:   Routine Release [[id number]]   Total time spent during this patient encounter is 35 minutes. The total time spent with the patient includes at least one or more of the following: preparing to see the patient by reviewing of tests, prior notes or other relevant information, performing appropriate independent examination & evaluation, counseling, ordering of medications, tests or procedures, communicating with other healthcare professionals, when appropriate to coordinate care, documenting clinic information in the electronic medical records or other health records, independently interpreting results of tests and communicating the results to the patient/family or caregiver.

## 2023-11-09 NOTE — PAYOR COMM NOTE
"Ruiz Mora (17 y.o. Male)     ATTN: REF# [id number]    REQUESTING APPROVAL FOR 09/02/2022. PATIENT DISCHARGED 09/03/2022    PLEASE REPLY TO UR DEPT: [phone],  [phone]    [facility]               Date of Birth   2005    Social Security Number       Address   [address] [place] [id number]    Home Phone   [phone]    MRN   [id number]       Orthodox   Quaker    Marital Status   Single                            Admission Date   8/25/22    Admission Type   Emergency    Admitting Provider   Leif Sheldon MD    Attending Provider       Department, Room/Bed   [facility] 5 EAST, E558/1       Discharge Date   9/3/2022    Discharge Disposition   Home or Self Care    Discharge Destination                               Attending Provider: (none)   Allergies: No Known Allergies    Isolation: None   Infection: None   Code Status: Prior   Advance Care Planning Activity    Ht: 188 cm (74.02\")   Wt: 167 kg (369 lb 3.2 oz)    Admission Cmt: None   Principal Problem: Bilateral pulmonary embolism (HCC) [I26.99]                 Active Insurance as of 8/25/2022     Primary Coverage     Payor Plan Insurance Group Employer/Plan Group    [employer] BY RAFAT [facility] BY RAFAT [id number]     Payor Plan Address Payor Plan Phone Number Payor Plan Fax Number Effective Dates    [address] [address]   1/1/2021 - None Entered    [facility] [address]       Subscriber Name Subscriber Birth Date Member ID       RUIZ MORA 2005 [id number]                 Emergency Contacts      (Rel.) Home Phone Work Phone Mobile Phone    VICTORIA MORA (Father) -- -- [phone]    HECTOR MORA (Mother) -- -- [phone]    Bettye Mora (Step Parent) -- -- [phone]            Vital Signs     Date/Time Temp Temp src Pulse Resp BP Patient Position SpO2    09/03/22 1500 99 (37.2) Oral 97 16 144/78 Lying 98    09/03/22 1300 -- -- " Did not determine/confirm level of effectiveness for tacrolimus since prohibitive cost made it a non-issue. Please check with patient/parent. If tacrolimus was ineffective, please update chart and try Eucrisa again. Next step if Eucrisa denied would be pimecrolimus (same side effects as tacrolimus but cream form). If they cannot confirm that tacrolimus was ineffective and patient is on a new insurance plan, tacrolimus 0.03% ointment can be tried again.    110 -- -- -- 98    09/03/22 1100 98.9 (37.2) Oral 103 16 134/80 Lying 98    09/03/22 0700 98 (36.7) Oral 99 16 138/79 Sitting 98    09/03/22 0020 -- -- 89 -- -- -- 98    09/03/22 0014 99.1 (37.3) Oral 106 16 127/74 Lying 98    09/02/22 2159 -- -- 106 -- -- -- 99    09/02/22 1917 99.3 (37.4) Oral 120 16 138/91 Sitting 96    09/02/22 1441 98.1 (36.7) Oral 120 16 147/96 Sitting 97    09/02/22 1100 99.2 (37.3) Oral 105 16 145/90 Sitting 98    09/02/22 0700 98.1 (36.7) Oral 93 16 138/86 Lying 99    09/01/22 2249 97.7 (36.5) Oral 102 16 124/74 Lying 97            Medication Administration Report for Ruiz Elizabeth as of 09/15/22 0840   Legend:    Given Hold Not Given Due Canceled Entry Other Actions    Time Time (Time) Time  Time-Action       Discontinued     Completed     Future     MAR Hold     Linked           Medications 08/25 08/26 08/27 08/28 08/29 08/30 08/31 09/01 09/02 09/03   Completed Medications    calcium gluconate 1g/50ml 0.675% NaCl IV SOLN  Dose: 2 g  Freq: Once Route: IV  Start: 08/26/22 1945   End: 08/26/22 2024   Admin Instructions:   Infuse 1 gram per hour     2024-New Bag                   ceFAZolin in Sodium Chloride (ANCEF) IVPB solution 3 g  Dose: 3 g  Freq: Every 8 Hours Route: IV  Indications of Use: PERIOPERATIVE PHARMACOPROPHYLAXIS  Start: 08/26/22 2200   End: 08/27/22 0547   Admin Instructions:   Caution: Look alike/sound alike drug alert. Refrigerate     2253-New Bag        0517-New Bag                  ceFAZolin in Sodium Chloride (ANCEF) IVPB solution 3 g  Dose: 3 g  Freq: Once Route: IV  Indications of Use: PERIOPERATIVE PHARMACOPROPHYLAXIS  Start: 08/26/22 1000   End: 08/26/22 1430   Admin Instructions:   For pre op on 8/26/22  Caution: Look alike/sound alike drug alert. Refrigerate     1033-New Bag       1103-Stopped       1430-Given [C]                   famotidine (PEPCID) injection 20 mg  Dose: 20 mg  Freq: Once Route: IV  Start: 08/26/22 0932   End: 08/26/22 0950   Admin  Instructions:   Give IV push over 2 minutes.     0950-Given                   fentaNYL citrate (PF) (SUBLIMAZE) injection  Freq: Code / Trauma / Sedation Medication Route: IV  Start: 08/26/22 0948   End: 08/26/22 0948     0948-Given                   heparin (porcine) 5000 UNIT/ML injection 10,000 Units  Dose: 66.7 Units/kg  Weight Dosing Info: 150 kg  Freq: Once Route: IV  Indications of Use: DVT/PE (active thrombosis)  Start: 08/25/22 0935   End: 08/25/22 1047   Admin Instructions:   **Max Dose of 10,000 units** Bolus for VTE / PE    1047-Given                    HYDROcodone-acetaminophen (NORCO) 7.5-325 MG per tablet 1 tablet  Dose: 1 tablet  Freq: Once As Needed Route: PO  PRN Reason: Moderate Pain  Start: 08/26/22 1541   End: 08/26/22 1713   Admin Instructions:   [TINA]    Do not exceed 4 grams of acetaminophen in a 24 hr period. Max dose of 2gm for AST/ALT greater than 120 units/L        If given for pain, use the following pain scale:   Mild Pain = Pain Score of 1-3, CPOT 1-2  Moderate Pain = Pain Score of 4-6, CPOT 3-4  Severe Pain = Pain Score of 7-10, CPOT 5-8     1713-Given                   HYDROmorphone (DILAUDID) injection 1 mg  Dose: 1 mg  Freq: Once Route: IV  Start: 08/25/22 1117   End: 08/25/22 1128   Admin Instructions:         Caution: Look alike/sound alike drug alert    If given for pain, use the following pain scale:  Mild Pain = Pain Score of 1-3, CPOT 1-2  Moderate Pain = Pain Score of 4-6, CPOT 3-4  Severe Pain = Pain Score of 7-10, CPOT 5-8    1128-Given                    hydrOXYzine (ATARAX) tablet 25 mg  Dose: 25 mg  Freq: Once Route: PO  Start: 08/29/22 0045   End: 08/29/22 0103   Admin Instructions:   Caution: Look alike/sound alike drug alert        0103-Given                iopamidol (ISOVUE-250) 51 % injection 200 mL  Dose: 200 mL  Freq: Once in Imaging Route: IV  Start: 08/26/22 1538   End: 08/26/22 1536     1536-Given                   iopamidol (ISOVUE-370) 76 % injection 150  mL  Dose: 150 mL  Freq: Once in Imaging Route: IV  Start: 08/25/22 1024   End: 08/25/22 1024    1024-Given by Other                    ketorolac (TORADOL) injection 15 mg  Dose: 15 mg  Freq: Once Route: IV  Start: 08/25/22 0716   End: 08/25/22 0737   Admin Instructions:         If given for pain, use the following pain scale:  Mild Pain = Pain Score of 1-3, CPOT 1-2  Moderate Pain = Pain Score of 4-6, CPOT 3-4  Severe Pain = Pain Score of 7-10, CPOT 5-8    0737-Given                    ketorolac (TORADOL) injection 30 mg  Dose: 30 mg  Freq: Once Route: IV  Start: 08/29/22 1215   End: 08/29/22 1221   Admin Instructions:   ([facility])        If given for pain, use the following pain scale:  Mild Pain = Pain Score of 1-3, CPOT 1-2  Moderate Pain = Pain Score of 4-6, CPOT 3-4  Severe Pain = Pain Score of 7-10, CPOT 5-8        1221-Given                midazolam (VERSED) injection  Freq: Code / Trauma / Sedation Medication Route: IV  Start: 08/26/22 0948   End: 08/26/22 0948     0948-Given                   ondansetron (ZOFRAN) injection 4 mg  Dose: 4 mg  Freq: Once As Needed Route: IV  PRN Reasons: Nausea,Vomiting  Indications of Use: POSTOPERATIVE NAUSEA AND VOMITING  Start: 08/26/22 1541   End: 08/26/22 1716   Admin Instructions:   If BOTH ondansetron (ZOFRAN) and promethazine (PHENERGAN) are ordered use ondansetron first and THEN promethazine IF ondansetron is ineffective.     1716-Given                   perflutren (DEFINITY) 8.476 mg in Sodium Chloride (PF) 0.9 % 10 mL injection  Dose: 2 mL  Freq: Once in Imaging Route: IV  Start: 08/25/22 0115   End: 08/25/22 1329   Admin Instructions:   Mix 1.3 mL of mechanically activated Definity with 8.7 mL of normal saline. A total of 2 mL of the resulting Definity solution was administered.    1329-Given                    sodium chloride 0.9 % bolus 500 mL  Dose: 500 mL  Freq: Once Route: IV  Start: 08/25/22 0716   End: 08/25/22 0902    0737-New Bag       0902-Stopped                     warfarin (COUMADIN) tablet 10 mg  Dose: 10 mg  Freq: Once (Warfarin) Route: PO  Indications of Use: DVT/PE (active thrombosis)  Start: 08/30/22 1800   End: 08/30/22 1714   Admin Instructions:   Food-Drug Interaction  If INR Greater Than Target, Contact Pharmacy Prior to Giving Dose.    Acutely Hazardous. Waste BOTH Residual Medication and/or Empty Package. .   Order specific questions:   Target INR 2 - 3           1714-Given               warfarin (COUMADIN) tablet 10 mg  Dose: 10 mg  Freq: Once (Warfarin) Route: PO  Indications of Use: DVT/PE (active thrombosis)  Start: 08/29/22 1800   End: 08/29/22 1722   Admin Instructions:   Food-Drug Interaction  If INR Greater Than Target, Contact Pharmacy Prior to Giving Dose.    Acutely Hazardous. Waste BOTH Residual Medication and/or Empty Package. .   Order specific questions:   Target INR 2 - 3          1722-Given               Discontinued Medications  Medications 08/25 08/26 08/27 08/28 08/29 08/30 08/31 09/01 09/02 09/03       acetaminophen (TYLENOL) tablet 650 mg  Dose: 650 mg  Freq: Every 6 Hours PRN Route: PO  PRN Reason: Mild Pain  Start: 08/25/22 1302   End: 08/26/22 1806   Admin Instructions:   Do not exceed 4 grams of acetaminophen in a 24 hr period. Max dose of 2gm for AST/ALT greater than 120 units/L    If given for fever, use fever parameter: fever greater than 100.4 °F.    If given for pain, use the following pain scale:   Mild Pain = Pain Score of 1-3, CPOT 1-2  Moderate Pain = Pain Score of 4-6, CPOT 3-4  Severe Pain = Pain Score of 7-10, CPOT 5-8     0915-MAR Hold       1806-MAR Unhold                   bisacodyl (DULCOLAX) EC tablet 10 mg  Dose: 10 mg  Freq: Daily Route: PO  Start: 09/02/22 1015   End: 09/03/22 2047   Admin Instructions:   Swallow whole. Do not crush, split, or chew tablet.            1102-Given        (0905)-Not Given           bisacodyl (DULCOLAX) suppository 10 mg  Dose: 10 mg  Freq: Daily Route: RE  Start: 09/02/22 2000   End:  09/03/22 1136            2002-Given        (0904)-Not Given           bisacodyl (DULCOLAX) suppository 10 mg  Dose: 10 mg  Freq: Daily Route: RE  Start: 09/02/22 1400   End: 09/02/22 1345                 diphenhydrAMINE (BENADRYL) capsule 25 mg  Dose: 25 mg  Freq: Every 6 Hours PRN Route: PO  PRN Reason: Itching  Start: 08/27/22 1523   End: 09/03/22 2047   Admin Instructions:   Caution: Look alike/sound alike drug alert. This med may be ordered in other forms and routes. Before giving verify the last time the drug was given by any route/form.        1556-Given        0553-Given       1634-Given       2217-Given                 diphenhydrAMINE (BENADRYL) capsule 25 mg  Dose: 25 mg  Freq: Every 30 Minutes PRN Route: PO  PRN Reason: Itching  PRN Comment: May repeat x 1  Indications of Use: EXTRAPYRAMIDAL DISORDER,PRURITUS  Start: 08/26/22 1541   End: 08/26/22 1805   Admin Instructions:   Caution: Look alike/sound alike drug alert. This med may be ordered in other forms and routes. Before giving verify the last time the drug was given by any route/form.                   diphenhydrAMINE (BENADRYL) injection 12.5 mg  Dose: 12.5 mg  Freq: Every 15 Minutes PRN Route: IV  PRN Reason: Itching  PRN Comment: May repeat x 1  Start: 08/26/22 1541   End: 08/26/22 1805   Admin Instructions:   Caution: Look alike/sound alike drug alert. This med may be ordered in other forms and routes. Before giving verify the last time the drug was given by any route/form.                   docusate sodium (COLACE) capsule 100 mg  Dose: 100 mg  Freq: 2 Times Daily Route: PO  Start: 08/30/22 1030   End: 08/31/22 1533   Admin Instructions:   Swallow whole.  Do not open, crush, or chew capsule.         1030-Given       2048-Given        0851-Given              ePHEDrine injection 5 mg  Dose: 5 mg  Freq: Once As Needed Route: IV  PRN Comment: symptomatic hypotension - Notify attending anesthesiologist if this needs to be given  Start: 08/26/22 1541    End: 08/26/22 1805   Admin Instructions:   Caution: Look alike/sound alike drug alert   Dilute with NS to 5-10 mg/mL.  Central line preferred, if unavailable use large bore IV access with frequent nurse monitoring of IV site.                 fentaNYL citrate (PF) (SUBLIMAZE) injection 50 mcg  Dose: 50 mcg  Freq: Every 5 Minutes PRN Route: IV  PRN Reasons: Moderate Pain,Severe Pain  Start: 08/26/22 1541   End: 08/26/22 1805   Admin Instructions:   May alternate fentanyl with hydromorphone using fentanyl first.    Maximum total dose of fentanyl is 200 mcg.  If given for pain, use the following pain scale:  Mild Pain = Pain Score of 1-3, CPOT 1-2  Moderate Pain = Pain Score of 4-6, CPOT 3-4  Severe Pain = Pain Score of 7-10, CPOT 5-8     1628-Given                   fentaNYL citrate (PF) (SUBLIMAZE) injection 50 mcg  Dose: 50 mcg  Freq: Every 10 Minutes PRN Route: IV  PRN Reason: Severe Pain  Start: 08/26/22 0930   End: 08/26/22 1612   Admin Instructions:   Maximum total dose of fentanyl is 100 mcg.  If given for pain, use the following pain scale:  Mild Pain = Pain Score of 1-3, CPOT 1-2  Moderate Pain = Pain Score of 4-6, CPOT 3-4  Severe Pain = Pain Score of 7-10, CPOT 5-8     0938                   flumazenil (ROMAZICON) injection 0.2 mg  Dose: 0.2 mg  Freq: As Needed Route: IV  PRN Comment: for benzodiazepine induced unresponsiveness or sedation  Indications of Use: BENZODIAZEPINE-INDUCED SEDATION  Start: 08/26/22 1541   End: 08/26/22 1805   Admin Instructions:   Notify Anesthesia if given  ** give IV over 15-30 seconds **                 heparin (porcine) 5000 UNIT/ML injection 6,000-10,000 Units  Dose: 40-66.7 Units/kg  Weight Dosing Info: 150 kg  Freq: Every 6 Hours PRN Route: IV  PRN Comment: Per Heparin Nomogram  Indications of Use: DVT/PE (active thrombosis)  Start: 08/25/22 0932   End: 08/31/22 1430   Admin Instructions:   **Max Dose of 10,000 units** Bolus for VTE / PE:  PTT Less Than 60 sec, Administer 80  units/kg Bolus   PTT 60 - 70 sec, Administer 40 units/kg Bolus    2014-Given        0445-Given       0915-MAR Hold       1806-MAR Unhold        1516-Given           0906-Given              heparin 99308 units/250 mL (100 units/mL) in 0.45 % NaCl infusion  Rate: 15 mL/hr Dose: 10 Units/kg/hr  Weight Dosing Info: 150 kg  Freq: Titrated Route: IV  Indications of Use: DVT/PE (active thrombosis)  Start: 08/25/22 0935   End: 08/31/22 1430   Admin Instructions:   Weight Based Heparin Nomogram: VTE / PE: Initial Heparin Infusion 18 units/kg/hr (initial max of 1,500 units/hr)  aPTT Less Than 60: Bolus 80 units/kg & Increase Dose By 4 units/kg/hr.  aPTT 60-70: Bolus 40 units/kg & Increase Dose By 2 units/kg/hr.  aPTT 71-95: No Bolus, No Dose Change  aPTT : No Bolus, Decrease Dose By 2 units/kg/hr.  aPTT Greater Than 115: No Bolus. Hold Infusion For 1 hour.  Decrease Dose By 3 units/kg/hr. Repeat aPTT 6 Hours After Restarted.  If aPTT Remains Greater Than 115 Stop Heparin Drip & Contact Provider    1046-New Bag       1102-Currently Infusing       2008-Rate Change (DUAL SIGN)        0211-New Bag       0445-Rate Change (DUAL SIGN)       1042-Currently Infusing       1127-Stopped [C]       1547-Restarted        0013-Currently Infusing [C]       0301-New Bag       0727-Rate Change (DUAL SIGN)       1251-New Bag       1517-Rate Change (DUAL SIGN)       1933-New Bag       2250-Rate Change (DUAL SIGN)        0249-New Bag       0929-New Bag       1630-New Bag        0045-New Bag       0744-New Bag       1017-Rate Change (DUAL SIGN)       1602-New Bag       1723-Currently Infusing [C]       1928-Handoff        0019-New Bag       0628-Stopped       0724-Restarted       0857-New Bag       1423-Currently Infusing [C]       1706-New Bag       1922-Handoff        0127-New Bag       0907-New Bag       1045-New Bag       1507-Stopped              HYDROcodone-acetaminophen (NORCO)  MG per tablet 1 tablet  Dose: 1 tablet  Freq:  Every 4 Hours PRN Route: PO  PRN Reason: Moderate Pain  Start: 09/02/22 2011   End: 09/03/22 2047   Admin Instructions:   [TINA]    Do not exceed 4 grams of acetaminophen in a 24 hr period. Max dose of 2gm for AST/ALT greater than 120 units/L        If given for pain, use the following pain scale:   Mild Pain = Pain Score of 1-3, CPOT 1-2  Moderate Pain = Pain Score of 4-6, CPOT 3-4  Severe Pain = Pain Score of 7-10, CPOT 5-8             0649-Given       1304-Given       1746-Given           HYDROcodone-acetaminophen (NORCO)  MG per tablet 1 tablet  Dose: 1 tablet  Freq: Every 4 Hours PRN Route: PO  PRN Reason: Moderate Pain  Start: 08/26/22 1806   End: 09/02/22 1805   Admin Instructions:   [TINA]    Do not exceed 4 grams of acetaminophen in a 24 hr period. Max dose of 2gm for AST/ALT greater than 120 units/L        If given for pain, use the following pain scale:   Mild Pain = Pain Score of 1-3, CPOT 1-2  Moderate Pain = Pain Score of 4-6, CPOT 3-4  Severe Pain = Pain Score of 7-10, CPOT 5-8      0609-Given       1026-Given       1427-Given       1936-Given        0253-Given       0836-Given       1218-Given       1717-Given       2122-Given        0556-Given       1007-Given       1722-Given       2148-Given        0335-Given       0844-Given       1219-Given       1617-Given       2107-Given        0540-Given       1057-Given       1512-Given       1844-Given        0750-Return to Cabinet [C]       0853-Given       1549-Given        0829-Given       1304-Given       1751-Given            HYDROcodone-acetaminophen (NORCO) 7.5-325 MG per tablet 1 tablet  Dose: 1 tablet  Freq: Every 4 Hours PRN Route: PO  PRN Reason: Severe Pain  Start: 08/25/22 1302   End: 08/26/22 1806   Admin Instructions:   [TINA]    Do not exceed 4 grams of acetaminophen in a 24 hr period.    If given for pain, use the following pain scale:   Mild Pain = Pain Score of 1-3, CPOT 1-2  Moderate Pain = Pain Score of 4-6, CPOT 3-4  Severe Pain =  Pain Score of 7-10, CPOT 5-8  [TINA]    Do not exceed 4 grams of acetaminophen in a 24 hr period. Max dose of 2gm for AST/ALT greater than 120 units/L        If given for pain, use the following pain scale:   Mild Pain = Pain Score of 1-3, CPOT 1-2  Moderate Pain = Pain Score of 4-6, CPOT 3-4  Severe Pain = Pain Score of 7-10, CPOT 5-8    1605-Given       1950-Given        0019-Given       0415-Given       0915-MAR Hold       1806-MAR Unhold                   HYDROmorphone (DILAUDID) injection 0.5 mg  Dose: 0.5 mg  Freq: Every 2 Hours PRN Route: IV  PRN Reason: Severe Pain  Start: 09/02/22 2011   End: 09/03/22 2047   Admin Instructions:   If given for pain, use the following pain scale:  Mild Pain = Pain Score of 1-3, CPOT 1-2  Moderate Pain = Pain Score of 4-6, CPOT 3-4  Severe Pain = Pain Score of 7-10, CPOT 5-8            2020-Given        0619-Given       1548-Given       1827-Given           HYDROmorphone (DILAUDID) injection 0.5 mg  Dose: 0.5 mg  Freq: Every 2 Hours PRN Route: IV  PRN Reason: Severe Pain  Start: 08/26/22 1806   End: 09/02/22 1805   Admin Instructions:   If given for pain, use the following pain scale:  Mild Pain = Pain Score of 1-3, CPOT 1-2  Moderate Pain = Pain Score of 4-6, CPOT 3-4  Severe Pain = Pain Score of 7-10, CPOT 5-8      0517-Given       2257-Given        0253-Given            0909-Given       1330-Given       1746-Given            HYDROmorphone (DILAUDID) injection 0.5 mg  Dose: 0.5 mg  Freq: Every 5 Minutes PRN Route: IV  PRN Reasons: Moderate Pain,Severe Pain  Start: 08/26/22 1541   End: 08/26/22 1805   Admin Instructions:   May alternate fentanyl with hydromorphone using fentanyl first.    Maximum total dose of hydromorphone is 2 mg.  If given for pain, use the following pain scale:  Mild Pain = Pain Score of 1-3, CPOT 1-2  Moderate Pain = Pain Score of 4-6, CPOT 3-4  Severe Pain = Pain Score of 7-10, CPOT 5-8     1650-Given                   HYDROmorphone (DILAUDID) injection  0.5 mg  Dose: 0.5 mg  Freq: Every 3 Hours PRN Route: IV  PRN Reason: Severe Pain  Start: 08/25/22 1957   End: 09/01/22 1956   Admin Instructions:   If given for pain, use the following pain scale:  Mild Pain = Pain Score of 1-3, CPOT 1-2  Moderate Pain = Pain Score of 4-6, CPOT 3-4  Severe Pain = Pain Score of 7-10, CPOT 5-8    2004-Given       2259-Given        0215-Given       0641-Given       0915-MAR Hold       1806-MAR Unhold       2025-Given        0214-Given       0841-Given       1142-Given       1709-Given        1014-Given       1451-Given       1920-Given       2217-Given        0103-Given       0556-Given       1113-Given        1108-Given       1433-Given        0907-Given       1349-Given        1041-Given       1740-Given             labetalol (NORMODYNE,TRANDATE) injection 5 mg  Dose: 5 mg  Freq: Every 5 Minutes PRN Route: IV  PRN Reason: High Blood Pressure  PRN Comment: for systolic blood pressure greater than 180 mmHg or diastolic blood pressure greater than 105 mmHg  Start: 08/26/22 1541   End: 08/26/22 1805   Admin Instructions:   Hold for heart rate less than 60.  Give by slow IV Push each 10mg over 2 minutes.                 lactated ringers infusion  Rate: 125 mL/hr Dose: 125 mL/hr  Freq: Continuous Route: IV  Start: 08/26/22 1900   End: 08/26/22 1856                 lactated ringers infusion  Rate: 9 mL/hr Dose: 9 mL/hr  Freq: Continuous Route: IV  Start: 08/26/22 0932   End: 08/26/22 1806     0945-New Bag       1042-Currently Infusing       1140-Anesthesia Volume Adjustment       1218-Anesthesia Volume Adjustment       1311-Anesthesia Volume Adjustment       1338-Anesthesia Volume Adjustment       1423-Anesthesia Volume Adjustment       1459-Anesthesia Volume Adjustment       1530-Paused [C]       1531-Restarted       1559-Anesthesia Volume Adjustment                   lactulose (CHRONULAC) 10 GM/15ML solution 10 g  Dose: 10 g  Freq: 2 Times Daily Route: PO  Start: 09/01/22 1545   End:  09/02/22 1540   Admin Instructions:   May be mixed with fruit juice, water, or milk.           (1809)-Not Given       2028-Given        0829-Given            lactulose (CHRONULAC) 10 GM/15ML solution 20 g  Dose: 20 g  Freq: 3 Times Daily Route: PO  Start: 09/02/22 1630   End: 09/03/22 1136   Admin Instructions:   May be mixed with fruit juice, water, or milk.            1629-Given       2001-Given        0903-Given           lidocaine PF 1% (XYLOCAINE) injection 0.5 mL  Dose: 0.5 mL  Freq: Once As Needed Route: IJ  PRN Comment: IV Start  Start: 08/26/22 0930   End: 08/26/22 1612                 midazolam (VERSED) injection 1 mg  Dose: 1 mg  Freq: Every 10 Minutes PRN Route: IV  PRN Comment: Anxiety prophylaxis, Pre-op comfort  Start: 08/26/22 0930   End: 08/26/22 1612   Admin Instructions:   May repeat dose in 10 minutes one time then contact provider for additional orders.         0939                   naloxone (NARCAN) injection 0.2 mg  Dose: 0.2 mg  Freq: As Needed Route: IV  PRN Reasons: Opioid Reversal,Respiratory Depression  PRN Comment: unresponsiveness, decrease oxygen saturation  Indications of Use: ACUTE RESPIRATORY FAILURE,OPIOID-INDUCED RESPIRATORY DEPRESSION  Start: 08/26/22 1541   End: 08/26/22 1805   Admin Instructions:   Notify Anesthesia if given                 nitroglycerin (NITROSTAT) SL tablet 0.4 mg  Dose: 0.4 mg  Freq: Every 5 Minutes PRN Route: SL  PRN Reason: Chest Pain  PRN Comment: Systolic BP Greater Than 100  Start: 08/26/22 1806   End: 09/03/22 2047   Admin Instructions:   Notify Provider if Pain Unrelieved After 3 Doses         1414-Canceled Entry [C]        1136-Canceled Entry [C]              ondansetron (ZOFRAN) tablet 4 mg  Dose: 4 mg  Freq: Every 6 Hours PRN Route: PO  PRN Reasons: Nausea,Vomiting  Start: 08/25/22 1302   End: 09/03/22 2047     0915-MAR Hold       1806-MAR Unhold       2202-Not Given:  See Alt           1414-Given        1057-Given             Or  ondansetron  (ZOFRAN) injection 4 mg  Dose: 4 mg  Freq: Every 6 Hours PRN Route: IV  PRN Reasons: Nausea,Vomiting  Start: 08/25/22 1302   End: 09/03/22 2047     0915-MAR Hold       1806-MAR Unhold       2202-Given           1414-Not Given:  See Alt        1057-Not Given:  See Alt              polyethylene glycol (MIRALAX) packet 17 g  Dose: 17 g  Freq: Daily Route: PO  Start: 08/31/22 1630   End: 09/03/22 1136   Admin Instructions:   Use 4-8 ounces of water, tea, or juice for each 17 gram dose.          1724-Given        0853-Given        0829-Given        0905-Given           promethazine (PHENERGAN) suppository 25 mg  Dose: 25 mg  Freq: Once As Needed Route: RE  PRN Reasons: Nausea,Vomiting  Start: 08/26/22 1541   End: 08/26/22 1805   Admin Instructions:   If BOTH ondansetron (ZOFRAN) and promethazine (PHENERGAN) are ordered use ondansetron first and THEN promethazine IF ondansetron is ineffective.                Or  promethazine (PHENERGAN) tablet 25 mg  Dose: 25 mg  Freq: Once As Needed Route: PO  PRN Reasons: Nausea,Vomiting  Start: 08/26/22 1541   End: 08/26/22 1805   Admin Instructions:   If BOTH ondansetron (ZOFRAN) and promethazine (PHENERGAN) are ordered use ondansetron first and THEN promethazine IF ondansetron is ineffective.                     sennosides-docusate (PERICOLACE) 8.6-50 MG per tablet 2 tablet  Dose: 2 tablet  Freq: 2 Times Daily Route: PO  Start: 08/31/22 1630   End: 09/03/22 1136          1723-Given        0853-Given       2028-Given        0829-Given       2001-Given        0903-Given           sodium chloride 0.9 % flush 3 mL  Dose: 3 mL  Freq: Every 12 Hours Scheduled Route: IV  Start: 08/26/22 0932   End: 08/26/22 1612     0932                   sodium chloride 0.9 % flush 3-10 mL  Dose: 3-10 mL  Freq: As Needed Route: IV  PRN Reason: Line Care  Start: 08/26/22 0930   End: 08/26/22 1612                 sodium chloride 0.9 % infusion  Rate: 125 mL/hr Dose: 125 mL/hr  Freq: Continuous Route:  IV  Start: 08/26/22 1945   End: 08/27/22 1018     2027-New Bag                   sodium chloride 1,000 mL with heparin (porcine) 10,000 Units mixture  Freq: As Needed  Start: 08/26/22 1137   End: 08/26/22 1544     1137-Given [C]       1430-Given       1504-Given                   warfarin (COUMADIN) tablet 10 mg  Dose: 10 mg  Freq: Daily Warfarin Route: PO  Indications of Use: DVT/PE (active thrombosis)  Start: 09/01/22 1800   End: 09/03/22 0924   Admin Instructions:   Food-Drug Interaction  If INR Greater Than Target, Contact Pharmacy Prior to Giving Dose.    Acutely Hazardous. Waste BOTH Residual Medication and/or Empty Package. .   Order specific questions:   Target INR 2 - 3             1747-Given        1738-Given            warfarin (COUMADIN) tablet 10 mg  Dose: 10 mg  Freq: Daily Warfarin Route: PO  Indications of Use: DVT/PE (active thrombosis)  Start: 08/27/22 2030   End: 08/29/22 1406   Admin Instructions:   Food-Drug Interaction  If INR Greater Than Target, Contact Pharmacy Prior to Giving Dose.    Acutely Hazardous. Waste BOTH Residual Medication and/or Empty Package. .   Order specific questions:   Target INR 2 - 3        2250-Given        1717-Given                 warfarin (COUMADIN) tablet 7.5 mg  Dose: 7.5 mg  Freq: Daily Warfarin Route: PO  Indications of Use: DVT/PE (active thrombosis)  Start: 09/03/22 1800   End: 09/03/22 2047   Admin Instructions:   Food-Drug Interaction  If INR Greater Than Target, Contact Pharmacy Prior to Giving Dose.    Acutely Hazardous. Waste BOTH Residual Medication and/or Empty Package. .   Order specific questions:   Target INR 2 - 3               1746-Given           warfarin (COUMADIN) tablet 7.5 mg  Dose: 7.5 mg  Freq: Daily Warfarin Route: PO  Indications of Use: DVT/PE (active thrombosis)  Start: 08/31/22 1800   End: 09/01/22 1120   Admin Instructions:   Food-Drug Interaction  If INR Greater Than Target, Contact Pharmacy Prior to Giving Dose.    Acutely Hazardous.  Waste BOTH Residual Medication and/or Empty Package. .   Order specific questions:   Target INR 2 - 3            1723-Given                    Lab Results     Procedure Component Value Units Date/Time    Protime-INR [[id number]]  (Abnormal) Collected: 09/03/22 0537    Specimen: Blood Updated: 09/03/22 0604     Protime 27.0 Seconds      INR 2.58    Protime-INR [[id number]]  (Abnormal) Collected: 09/02/22 0621    Specimen: Blood Updated: 09/02/22 0723     Protime 23.9 Seconds      INR 2.20    Lupus Anticoagulant [[id number]] Collected: 08/27/22 1412    Specimen: Blood Updated: 09/01/22 1710     Dilute Prothrombin Time(dPT) 40.6 sec      dPT Confirm Ratio 0.99 Ratio      Thrombin Time 14.5 sec      PTT Lupus Anticoagulant 48.0 sec      Dilute Viper Venom Time 44.7 sec      Lupus Anticoagulant Reflex Comment:     Comment: No lupus anticoagulant was detected.       Narrative:      Performed at:  [address] [place] [address]  [id number]  : Uzair Matson MD, Phone:  [phone]    Antiphosphotidyl Antibodies Panel II [[id number]] Collected: 08/25/22 1648    Specimen: Blood from Hand, Right Updated: 09/01/22 1209     Anti-Phosphatidyl Inositol Comment     Anti-Phosphatidyl,IgA 2.2 U/mL      Comment: Negative        Anti-Phosphatidyl,IgG 2.3 U/mL      Comment: Negative        Anti-Phosphatidyl,IgM 2.9 U/mL      Comment: Negative  Reference Range applies to Antiphosphatidylinositol IgA,  IgG and IgM  Negative:     <12.0  Equivocal:     12.0 - 18.0  Elevated:     >18.0  The performance characteristics of the listed assay was  validated by [facility]. The [place] FDA has not approved or cleared this  test. The results of  this assay can be used for clinical diagnosis without FDA  approval. [facility] is a CLIA certified, CAP accredited  laboratory for performing high  complexity assays such as this one.  Testing Performed at: [facility] [address]  [facility] [place] [address]        Anti-Phosphatidic Acid Comment     Anti-Phosphatidic,IgA 3.2 U/mL      Anti-Phosphatidic,IgG 3.4 U/mL      Anti-Phosphatidic,IgM 2.5 U/mL      Comment: Reference Range applies to Antiphosphatidicacid IgA, IgG  and IgM  Negative:     <12.0  Equivocal:     12.0 - 18.0  Elevated:     >18.0  The performance characteristics of the listed assay was  validated by [facility]. The [place] FDA has not approved or cleared this  test. The results of  this assay can be used for clinical diagnosis without FDA  approval. [facility] is a CLIA certified, CAP accredited  laboratory for performing high  complexity assays such as this one.  Testing Performed at: [facility] [address] [place] [address]        Anti-Phosphatidylethanolamine Comment     Anti-Phosphatidylethanolamine, IgA 0.8 U/mL      Anti-Phosphatidylethanolamine, IgG 0.5 U/mL      Anti-Phosphatidylethanolamine, IgM 1.3 U/mL      Comment: Reference Range applies to Antiphosphatidylethanolamine  IgA, IgG and IgM  Negative:     <12.0  Equivocal:     12.0 - 18.0  Elevated:     >18.0  The performance characteristics of the listed assay was  validated by [facility]. The [place] FDA has not approved or cleared this  test. The results of  this assay can be used for clinical diagnosis without FDA  approval. [facility] is a CLIA certified, CAP accredited  laboratory for performing high  complexity assays such as this one.  Testing Performed at: [facility] [address] [place] [id number]        Anti-Phosphatidyl Glycerol Comment     Anti-Phosphatidyl Glycerol, IgA 2.3 U/mL      Comment: Negative        Anti-Phosphatidyl Glycerol, IgG 1.8 U/mL      Comment: Negative        Anti-Phosphatidyl Glycerol, IgM 1.8 U/mL      Comment: Negative  Reference Range applies to Antiphosphatidylglycerol IgA,  IgG and IgM  Negative:     <12.0  Equivocal:     12.0 -  18.0  Elevated:     >18.0  The performance characteristics of the listed assay was  validated by [facility]. The [place] FDA has not approved or cleared this  test. The results of  this assay can be used for clinical diagnosis without FDA  approval. [facility] is a CLIA certified, CAP accredited  laboratory for performing high  complexity assays such as this one.  Testing Performed at: [facility] [address] [place] [address]       Narrative:      Performed at:  01 - [facility]  [address] [place] [address]  [id number]  : Jacquelyn Jiménez PhD, Phone:  [phone]         Orders      Start     Ordered    09/04/22 0000  HYDROcodone-acetaminophen (NORCO)  MG per tablet  Every 4 Hours PRN         09/04/22 1401    09/03/22 0000  warfarin (COUMADIN) 7.5 MG tablet  Nightly         09/03/22 1419    09/03/22 0000  sennosides-docusate (senna-docusate sodium) 8.6-50 MG per tablet  2 Times Daily         09/03/22 1419    09/03/22 0000  Activity as Tolerated         09/03/22 1419    09/03/22 0000  Diet: Regular; Thin         09/03/22 1419    09/03/22 0000  Discharge Follow-up with Specified Provider: Dr. Juan (Vascular surgery); 6 Weeks         09/03/22 1419    09/03/22 0000  Ambulatory Referral to Lymphedema Clinic        Comments: LLE compression    09/03/22 1429    09/02/22 0000  Walker         09/02/22 0946    --  SCANNED - TELEMETRY           08/25/22 0000    --  SCANNED - TELEMETRY           08/25/22 0000    --  SCANNED - TELEMETRY           08/25/22 0000    --  SCANNED - TELEMETRY           08/25/22 0000    --  SCANNED - TELEMETRY           08/25/22 0000    --  SCANNED - TELEMETRY           08/25/22 0000    --  SCANNED - TELEMETRY           08/25/22 0000    --  SCANNED - TELEMETRY           08/25/22 0000    --  SCANNED - TELEMETRY           08/25/22 0000    --  SCANNED - TELEMETRY           08/25/22 0000    --  SCANNED - TELEMETRY            22 0000    --  SCANNED - TELEMETRY           22 0000    --  SCANNED - TELEMETRY           22 0000    --  SCANNED - TELEMETRY           22 0000    --  SCANNED - TELEMETRY           22 0000    --  SCANNED - TELEMETRY           22 0000                   Physician Progress Notes       Yordy Ramírez MD at 22 1540              Name: Ruiz Elizabeth ADMIT: 2022   : 2005  PCP: Provider, No Known    MRN: [id number] LOS: 8 days   AGE/SEX: 17 y.o. male  ROOM: [facility]     Subjective   Subjective   CC: LLE Edema, Dyspnea  No acute events. Patient overall feels better. No new complaints. Worked with PT today. Taking PO. No CP/f/c/n/v/d. No BM yet.  Patient's father is at bedside.  Objective   Objective   Vital Signs  Temp:  [97.3 °F (36.3 °C)-99.2 °F (37.3 °C)] 98.1 °F (36.7 °C)  Heart Rate:  [] 120  Resp:  [16] 16  BP: (124-160)/(74-96) 147/96  SpO2:  [97 %-99 %] 97 %  on   ;   Device (Oxygen Therapy): room air  Body mass index is 47.38 kg/m².  Physical Exam  Vitals and nursing note reviewed.   Constitutional:       General: He is not in acute distress.     Appearance: He is not toxic-appearing or diaphoretic.   HENT:      Head: Normocephalic and atraumatic.      Nose: Nose normal.      Mouth/Throat:      Mouth: Mucous membranes are moist.      Pharynx: Oropharynx is clear.   Eyes:      Conjunctiva/sclera: Conjunctivae normal.      Pupils: Pupils are equal, round, and reactive to light.   Cardiovascular:      Rate and Rhythm: Normal rate and regular rhythm.      Pulses: Normal pulses.   Pulmonary:      Effort: Pulmonary effort is normal.      Breath sounds: Normal breath sounds.   Abdominal:      General: Bowel sounds are normal.      Palpations: Abdomen is soft.      Tenderness: There is no abdominal tenderness.   Musculoskeletal:         General: Swelling (LLE, in compression wrap) present. No tenderness.      Cervical back: Normal range of motion and  neck supple.   Skin:     General: Skin is warm and dry.      Capillary Refill: Capillary refill takes less than 2 seconds.   Neurological:      General: No focal deficit present.      Mental Status: He is alert and oriented to person, place, and time.   Psychiatric:         Mood and Affect: Mood normal.         Behavior: Behavior normal.     Results Review     I reviewed the patient's new clinical results.  I reviewed the patient's telemetry.   Results from last 7 days   Lab Units 08/31/22  0600 08/30/22  0510 08/29/22  0648 08/28/22  0455   WBC 10*3/mm3 10.07 8.99 9.60 10.89*   HEMOGLOBIN g/dL 10.8* 9.5* 9.4* 9.3*   PLATELETS 10*3/mm3 409 389 360 305     Results from last 7 days   Lab Units 08/31/22  0600 08/30/22  0510 08/29/22  0648 08/28/22  0455   SODIUM mmol/L 135* 138 130* 134*   POTASSIUM mmol/L 4.7 4.5 4.2 4.1   CHLORIDE mmol/L 97* 102 97* 100   CO2 mmol/L 26.9 27.6 28.2 26.0   BUN mg/dL 8 7 6 8   CREATININE mg/dL 0.54* 0.47* 0.50* 0.54*   GLUCOSE mg/dL 101* 103* 99 101*   EGFR mL/min/1.73 147.2 153.5 150.7 147.2     Results from last 7 days   Lab Units 08/31/22  0600 08/30/22  0510 08/29/22 0648 08/28/22  0455   ALBUMIN g/dL 3.30* 2.70* 3.00* 2.90*   BILIRUBIN mg/dL 0.3 0.2 0.3 0.3   ALK PHOS U/L 61 50 52 50   AST (SGOT) U/L 14 18 17 20   ALT (SGPT) U/L 32 31 26 18     Results from last 7 days   Lab Units 08/31/22  0600 08/30/22  0510 08/29/22  0648 08/28/22  0455   CALCIUM mg/dL 9.4 8.5* 8.5* 7.9*   ALBUMIN g/dL 3.30* 2.70* 3.00* 2.90*       No results found for: HGBA1C, POCGLU    No radiology results for the last day  Scheduled Medications  bisacodyl, 10 mg, Oral, Daily  bisacodyl, 10 mg, Rectal, Daily  lactulose, 20 g, Oral, TID  polyethylene glycol, 17 g, Oral, Daily  senna-docusate sodium, 2 tablet, Oral, BID  warfarin, 10 mg, Oral, Daily    Infusions  Pharmacy to dose warfarin,     Diet  Diet Regular; Daily Fluid Restriction; 1500 mL Fluid Per Day      Assessment/Plan     Active Hospital Problems     Diagnosis  POA   • **Bilateral pulmonary embolism (HCC) [I26.99]  Yes   • Slow transit constipation [K59.01]  Yes   • Urine retention [R33.9]  Yes   • DVT, lower extremity, proximal, acute, left (HCC) [I82.4Y2]  Yes   • DVT, lower extremity, distal, acute, left (HCC) [I82.4Z2]  Yes   • Obesity, Class III, BMI 40-49.9 (morbid obesity) (HCC) [E66.01]  Yes      Resolved Hospital Problems   No resolved problems to display.   BLE DVT/Bilateral PE  - had extensive BLE DVT and inferior vena cava DVT  - s/p BLE venous thrombectomy and vena cava thrombectomy 22  - continue warfarin, INR therapeutic   - continue LLE compression wrap  - appreciate vascular surgery recs  - appreciate hematology recs    Scrotal Edema  - related to above, improved  - appreciate urology recs, they have signed off    Urine Retention  - has serrano catheter  - will escalate bowel regimen to try and relieve this before we remove his serrnao catheter    Warfarin (home med) for DVT prophylaxis.  Full code.  Discussed with patient, family and nursing staff.  Anticipate discharge home in 1-2 days.      Yordy Ramírez MD  [facility]  22  15:40 EDT      Electronically signed by Yordy Ramírez MD at 22 1544          Yordy Ramírez MD at 22 1441              Name: Ruiz Elizabeth ADMIT: 2022   : 2005  PCP: Provider, No Known    MRN: [id number] LOS: 7 days   AGE/SEX: 17 y.o. male  ROOM: [facility]     Subjective   Subjective   CC: LLE Edema, Dyspnea  No acute events. Patient overall feels better. No new complaints. Worked with PT today. Taking PO. No CP/f/c/n/v/d. No BM yet.  Patient's father is at bedside.  Objective   Objective   Vital Signs  Temp:  [97.8 °F (36.6 °C)-98.5 °F (36.9 °C)] 98.3 °F (36.8 °C)  Heart Rate:  [] 121  Resp:  [16-19] 18  BP: (119-141)/(73-84) 119/84  SpO2:  [96 %-99 %] 97 %  on   ;   Device (Oxygen Therapy): room air  Body mass index is 47.38 kg/m².  Physical  Exam  Vitals and nursing note reviewed.   Constitutional:       General: He is not in acute distress.     Appearance: He is not toxic-appearing or diaphoretic.   HENT:      Head: Normocephalic and atraumatic.      Nose: Nose normal.      Mouth/Throat:      Mouth: Mucous membranes are moist.      Pharynx: Oropharynx is clear.   Eyes:      Conjunctiva/sclera: Conjunctivae normal.      Pupils: Pupils are equal, round, and reactive to light.   Cardiovascular:      Rate and Rhythm: Normal rate and regular rhythm.      Pulses: Normal pulses.   Pulmonary:      Effort: Pulmonary effort is normal.      Breath sounds: Normal breath sounds.   Abdominal:      General: Bowel sounds are normal.      Palpations: Abdomen is soft.      Tenderness: There is no abdominal tenderness.   Musculoskeletal:         General: Swelling (LLE, in compression wrap) present. No tenderness.      Cervical back: Normal range of motion and neck supple.   Skin:     General: Skin is warm and dry.      Capillary Refill: Capillary refill takes less than 2 seconds.   Neurological:      General: No focal deficit present.      Mental Status: He is alert and oriented to person, place, and time.   Psychiatric:         Mood and Affect: Mood normal.         Behavior: Behavior normal.     Results Review     I reviewed the patient's new clinical results.  I reviewed the patient's telemetry.     No radiology results for the last day  Scheduled Medications  lactulose, 10 g, Oral, BID  polyethylene glycol, 17 g, Oral, Daily  senna-docusate sodium, 2 tablet, Oral, BID  warfarin, 10 mg, Oral, Daily    Infusions  Pharmacy to dose warfarin,     Diet  Diet Regular; Daily Fluid Restriction; 1500 mL Fluid Per Day      Assessment/Plan     Active Hospital Problems    Diagnosis  POA   • **Bilateral pulmonary embolism (HCC) [I26.99]  Yes   • Slow transit constipation [K59.01]  Yes   • Urine retention [R33.9]  Yes   • DVT, lower extremity, proximal, acute, left (HCC) [I82.4Y2]   Yes   • DVT, lower extremity, distal, acute, left (HCC) [I82.4Z2]  Yes   • Obesity, Class III, BMI 40-49.9 (morbid obesity) ([facility]) [E66.01]  Yes      Resolved Hospital Problems   No resolved problems to display.   BLE DVT/Bilateral PE  - had extensive BLE DVT and inferior vena cava DVT  - s/p BLE venous thrombectomy and vena cava thrombectomy 8/26/22  - continue warfarin, INR therapeutic   - continue LLE compression wrap  - appreciate vascular surgery recs  - appreciate hematology recs    Scrotal Edema  - related to above, improved  - appreciate urology recs, they have signed off    Urine Retention  - has serrano catheter  - continue bowel regimen and add lactulose to try and relieve this before we remove his serrano catheter    Warfarin (home med) for DVT prophylaxis.  Full code.  Discussed with patient, family and nursing staff.  Anticipate discharge home in 1-2 days.      Yordy Ramírez MD  [facility]  09/01/22  14:47 EDT      Electronically signed by Yordy Ramírez MD at 09/01/22 1449              Discharge Summary      Yordy Ramírez MD at 09/03/22 1421          Date of Admission: 8/25/2022  Date of Discharge:  9/3/2022  Primary Care Physician: Provider, No Known     Discharge Diagnosis:  Active Hospital Problems    Diagnosis  POA   • **Bilateral pulmonary embolism (HCC) [I26.99]  Yes   • DVT, lower extremity, proximal, acute, left (HCC) [I82.4Y2]  Yes   • DVT, lower extremity, distal, acute, left (HCC) [I82.4Z2]  Yes   • Obesity, Class III, BMI 40-49.9 (morbid obesity) ([facility]) [E66.01]  Yes      Resolved Hospital Problems    Diagnosis Date Resolved POA   • Slow transit constipation [K59.01] 09/03/2022 Yes   • Urine retention [R33.9] 09/03/2022 Yes       Presenting Problem/History of Present Illness:  Bilateral pulmonary embolism (HCC) [I26.99]  Acute deep vein thrombosis (DVT) of proximal vein of left lower extremity (HCC) [I82.4Y2]  Acute deep vein thrombosis (DVT) of inferior vena  "cava ([facility]) [I82.220]     Hospital Course:  The patient is a 17 y.o. male who presented with severe left lower extremity and back pain. Please see admission H&P for further details. He was found to have an extensive left lower extremity DVT which extended to the inferior vena cava as well as bilateral pulmonary emboli. Fortunately he did not have evidence of cor pulmonale. He was started on a heparin drip. He was evaluated by vascular surgery and underwent thrombectomy on 8/26/22. He was started on coumadin and bridged to therapeutic INR with heparin. He did well postoperatively but lymphedema continues to be a problem in the left lower extremity. This is controlled with compression wraps and these should be continued as an outpatient-Therapy on Wheels has been arranged as well as a referral to lymphedema clinic. He should follow up with Dr. Juan of vascular surgery in 6 weeks as well as Dr. Cates of hematology and oncology as scheduled on 9/21/22. I did order a follow up INR next week to be forwarded to the [facility] office. He sees a PCP at [facility] but he is currently looking to transition to an internist or family practitioner and we have provided him with PCP referral information.  He had some constipation and urinary retention requiring a serrano catheter postoperatively. Both of these issues have resolved and he is voiding independently. He should continue a maintenance bowel regimen at home.     Exam Today:  Blood pressure 134/80, pulse 103, temperature 98.9 °F (37.2 °C), temperature source Oral, resp. rate 16, height 188 cm (74.02\"), weight (!) 167 kg (369 lb 3.2 oz), SpO2 98 %.  Vitals and nursing note reviewed.   Constitutional:       General: He is not in acute distress.     Appearance: He is not toxic-appearing or diaphoretic.   HENT:      Head: Normocephalic and atraumatic.      Nose: Nose normal.      Mouth/Throat:      Mouth: Mucous membranes are moist.      Pharynx: Oropharynx is clear. "   Eyes:      Conjunctiva/sclera: Conjunctivae normal.      Pupils: Pupils are equal, round, and reactive to light.   Cardiovascular:      Rate and Rhythm: Normal rate and regular rhythm.      Pulses: Normal pulses.   Pulmonary:      Effort: Pulmonary effort is normal.      Breath sounds: Normal breath sounds.   Abdominal:      General: Bowel sounds are normal.      Palpations: Abdomen is soft.      Tenderness: There is no abdominal tenderness.   Musculoskeletal:         General: LLE in compression wrap. No tenderness.      Cervical back: Normal range of motion and neck supple.   Skin:     General: Skin is warm and dry.      Capillary Refill: Capillary refill takes less than 2 seconds.   Neurological:      General: No focal deficit present.      Mental Status: He is alert and oriented to person, place, and time.   Psychiatric:         Mood and Affect: Mood normal.         Behavior: Behavior normal.     Procedures Performed:  Procedure(s):  BILATERAL LOWER EXTREMITY VENOUS THROMBECTOMY, INFERIOR VENA CAVA THROMBECTOMY, VENOGRAM      Consults:   Consults     Date and Time Order Name Status Description    8/28/2022 12:09 PM Inpatient Urology Consult Completed     8/25/2022 12:31 PM Hematology & Oncology Inpatient Consult      8/25/2022 11:26 AM [facility] (on-call MD unless specified) Details      8/25/2022  9:52 AM Surgery (on-call MD unless specified) Completed            Discharge Disposition:  Home or Self Care    Discharge Medications:     Discharge Medications      New Medications      Instructions Start Date   HYDROcodone-acetaminophen  MG per tablet  Commonly known as: NORCO   1 tablet, Oral, Every 4 Hours PRN      sennosides-docusate 8.6-50 MG per tablet  Commonly known as: PERICOLACE   2 tablets, Oral, 2 Times Daily, Hold if having loose stools      warfarin 7.5 MG tablet  Commonly known as: COUMADIN   7.5 mg, Oral, Nightly             Discharge Diet:   Diet Instructions     Diet: Regular; Thin      Discharge  Diet: Regular    Fluid Consistency: Thin          Activity at Discharge:   Activity Instructions     Activity as Tolerated            Follow-up Appointments:  Future Appointments   Date Time Provider Department Center   9/21/2022 11:20 AM LAB CHAIR 1 [facility]  [facility]   9/21/2022 11:40 AM Jame Cates MD [facility]     Additional Instructions for the Follow-ups that You Need to Schedule     Ambulatory Referral to Lymphedema Clinic   As directed      LLE compression    Order Comments: LLE compression     Service Requested: Bioimpedance         Discharge Follow-up with Specified Provider: Dr. Juan (Vascular surgery); 6 Weeks   As directed      To: Dr. Juan (Vascular surgery)    Follow Up: 6 Weeks               Test Results Pending at Discharge:  Pending Labs     Order Current Status    Protein S, Total & Free In process           Rome Ramírez MD  09/03/22  14:39 EDT    Time Spent on Discharge Activities: Greater than 30 minutes.          Electronically signed by Rome Ramírez MD at 09/03/22 1439       Discharge Order (From admission, onward)     Start     Ordered    09/03/22 1149  Discharge patient  Once        Comments: Pending voiding trial   Expected Discharge Date: 09/03/22    Expected Discharge Time: Afternoon    Discharge Disposition: Home or Self Care    Physician of Record for Attribution - Please select from Treatment Team: ROME RAMÍREZ [[id number]]    Review needed by CMO to determine Physician of Record: No       Question Answer Comment   Physician of Record for Attribution - Please select from Treatment Team ROME RAMÍREZ    Review needed by CMO to determine Physician of Record No        09/03/22 1148                      Lainey Castro RN       Case Management   Case Management/Social Work       Signed   Date of Service:  09/03/22 1847   Creation Time:  09/06/22 0827              Signed                Show:Clear  all  []Manual[x]Template[]Copied    Added by:  [x]Lainey Castro, RN      []Arline for details    Case Management Discharge Note        Final Note: Discharged home with plans for [facility] for lymphedema wraps and PT            Selected Continued Care - Discharged on 9/3/2022 Admission date: 8/25/2022 - Discharge disposition: Home or Self Care     Destination    No services have been selected for the patient.                 Durable Medical Equipment    No services have been selected for the patient.                 Dialysis/Infusion    No services have been selected for the patient.                 Home Medical Care    No services have been selected for the patient.                 Therapy    No services have been selected for the patient.                 Community Resources    No services have been selected for the patient.                 Community & DME    No services have been selected for the patient.                      Transportation Services  Private: Car     Final Discharge Disposition Code: 01 - home or self-care

## 2024-02-16 ENCOUNTER — OFFICE VISIT (OUTPATIENT)
Dept: ENDOCRINOLOGY | Age: 19
End: 2024-02-16
Payer: COMMERCIAL

## 2024-02-16 VITALS
BODY MASS INDEX: 40.43 KG/M2 | HEIGHT: 74 IN | DIASTOLIC BLOOD PRESSURE: 92 MMHG | OXYGEN SATURATION: 98 % | HEART RATE: 74 BPM | SYSTOLIC BLOOD PRESSURE: 128 MMHG | WEIGHT: 315 LBS

## 2024-02-16 DIAGNOSIS — E66.01 OBESITY, CLASS III, BMI 40-49.9 (MORBID OBESITY): Primary | ICD-10-CM

## 2024-02-16 PROCEDURE — 1159F MED LIST DOCD IN RCRD: CPT | Performed by: INTERNAL MEDICINE

## 2024-02-16 PROCEDURE — 1160F RVW MEDS BY RX/DR IN RCRD: CPT | Performed by: INTERNAL MEDICINE

## 2024-02-16 PROCEDURE — 99203 OFFICE O/P NEW LOW 30 MIN: CPT | Performed by: INTERNAL MEDICINE

## 2024-02-17 LAB
ALBUMIN SERPL-MCNC: 4.7 G/DL (ref 3.5–5.2)
ALBUMIN/GLOB SERPL: 1.8 G/DL
ALP SERPL-CCNC: 63 U/L (ref 39–117)
ALT SERPL-CCNC: 21 U/L (ref 1–41)
AST SERPL-CCNC: 12 U/L (ref 1–40)
BILIRUB SERPL-MCNC: 0.4 MG/DL (ref 0–1.2)
BUN SERPL-MCNC: 12 MG/DL (ref 6–20)
BUN/CREAT SERPL: 12.5 (ref 7–25)
CALCIUM SERPL-MCNC: 9.5 MG/DL (ref 8.6–10.5)
CHLORIDE SERPL-SCNC: 104 MMOL/L (ref 98–107)
CO2 SERPL-SCNC: 20.7 MMOL/L (ref 22–29)
CREAT SERPL-MCNC: 0.96 MG/DL (ref 0.76–1.27)
EGFRCR SERPLBLD CKD-EPI 2021: 116 ML/MIN/1.73
GLOBULIN SER CALC-MCNC: 2.6 GM/DL
GLUCOSE SERPL-MCNC: 89 MG/DL (ref 65–99)
POTASSIUM SERPL-SCNC: 4.2 MMOL/L (ref 3.5–5.2)
PROT SERPL-MCNC: 7.3 G/DL (ref 6–8.5)
SODIUM SERPL-SCNC: 140 MMOL/L (ref 136–145)
T4 FREE SERPL-MCNC: 1.47 NG/DL (ref 0.93–1.7)
TSH SERPL DL<=0.005 MIU/L-ACNC: 1.85 UIU/ML (ref 0.27–4.2)

## 2024-03-08 ENCOUNTER — OFFICE VISIT (OUTPATIENT)
Dept: ONCOLOGY | Facility: CLINIC | Age: 19
End: 2024-03-08
Payer: COMMERCIAL

## 2024-03-08 ENCOUNTER — LAB (OUTPATIENT)
Dept: LAB | Facility: HOSPITAL | Age: 19
End: 2024-03-08
Payer: COMMERCIAL

## 2024-03-08 VITALS
WEIGHT: 315 LBS | SYSTOLIC BLOOD PRESSURE: 134 MMHG | HEART RATE: 95 BPM | OXYGEN SATURATION: 98 % | BODY MASS INDEX: 40.43 KG/M2 | TEMPERATURE: 98 F | RESPIRATION RATE: 16 BRPM | DIASTOLIC BLOOD PRESSURE: 81 MMHG | HEIGHT: 74 IN

## 2024-03-08 DIAGNOSIS — I82.4Y2 DVT, LOWER EXTREMITY, PROXIMAL, ACUTE, LEFT: ICD-10-CM

## 2024-03-08 DIAGNOSIS — I82.4Y2 DVT, LOWER EXTREMITY, PROXIMAL, ACUTE, LEFT: Primary | ICD-10-CM

## 2024-03-08 DIAGNOSIS — I26.99 BILATERAL PULMONARY EMBOLISM: ICD-10-CM

## 2024-03-08 DIAGNOSIS — E66.01 OBESITY, CLASS III, BMI 40-49.9 (MORBID OBESITY): ICD-10-CM

## 2024-03-08 LAB
ALBUMIN SERPL-MCNC: 4.2 G/DL (ref 3.5–5.2)
ALBUMIN/GLOB SERPL: 1.4 G/DL
ALP SERPL-CCNC: 56 U/L (ref 39–117)
ALT SERPL W P-5'-P-CCNC: 11 U/L (ref 1–41)
ANION GAP SERPL CALCULATED.3IONS-SCNC: 12.6 MMOL/L (ref 5–15)
AST SERPL-CCNC: 13 U/L (ref 1–40)
BASOPHILS # BLD AUTO: 0.03 10*3/MM3 (ref 0–0.2)
BASOPHILS NFR BLD AUTO: 0.3 % (ref 0–1.5)
BILIRUB SERPL-MCNC: 0.5 MG/DL (ref 0–1.2)
BUN SERPL-MCNC: 11 MG/DL (ref 6–20)
BUN/CREAT SERPL: 13.4 (ref 7–25)
CALCIUM SPEC-SCNC: 9.4 MG/DL (ref 8.6–10.5)
CHLORIDE SERPL-SCNC: 105 MMOL/L (ref 98–107)
CO2 SERPL-SCNC: 24.4 MMOL/L (ref 22–29)
CREAT SERPL-MCNC: 0.82 MG/DL (ref 0.76–1.27)
DEPRECATED RDW RBC AUTO: 39.7 FL (ref 37–54)
EGFRCR SERPLBLD CKD-EPI 2021: 129 ML/MIN/1.73
EOSINOPHIL # BLD AUTO: 0.06 10*3/MM3 (ref 0–0.4)
EOSINOPHIL NFR BLD AUTO: 0.6 % (ref 0.3–6.2)
ERYTHROCYTE [DISTWIDTH] IN BLOOD BY AUTOMATED COUNT: 12.7 % (ref 12.3–15.4)
GLOBULIN UR ELPH-MCNC: 3.1 GM/DL
GLUCOSE SERPL-MCNC: 85 MG/DL (ref 65–99)
HCT VFR BLD AUTO: 48 % (ref 37.5–51)
HGB BLD-MCNC: 16.1 G/DL (ref 13–17.7)
IMM GRANULOCYTES # BLD AUTO: 0.02 10*3/MM3 (ref 0–0.05)
IMM GRANULOCYTES NFR BLD AUTO: 0.2 % (ref 0–0.5)
LYMPHOCYTES # BLD AUTO: 3.63 10*3/MM3 (ref 0.7–3.1)
LYMPHOCYTES NFR BLD AUTO: 38.8 % (ref 19.6–45.3)
MCH RBC QN AUTO: 29.2 PG (ref 26.6–33)
MCHC RBC AUTO-ENTMCNC: 33.5 G/DL (ref 31.5–35.7)
MCV RBC AUTO: 87 FL (ref 79–97)
MONOCYTES # BLD AUTO: 0.7 10*3/MM3 (ref 0.1–0.9)
MONOCYTES NFR BLD AUTO: 7.5 % (ref 5–12)
NEUTROPHILS NFR BLD AUTO: 4.92 10*3/MM3 (ref 1.7–7)
NEUTROPHILS NFR BLD AUTO: 52.6 % (ref 42.7–76)
NRBC BLD AUTO-RTO: 0 /100 WBC (ref 0–0.2)
PLATELET # BLD AUTO: 324 10*3/MM3 (ref 140–450)
PMV BLD AUTO: 9.7 FL (ref 6–12)
POTASSIUM SERPL-SCNC: 4 MMOL/L (ref 3.5–5.2)
PROT SERPL-MCNC: 7.3 G/DL (ref 6–8.5)
RBC # BLD AUTO: 5.52 10*6/MM3 (ref 4.14–5.8)
SODIUM SERPL-SCNC: 142 MMOL/L (ref 136–145)
WBC NRBC COR # BLD AUTO: 9.36 10*3/MM3 (ref 3.4–10.8)

## 2024-03-08 PROCEDURE — 85025 COMPLETE CBC W/AUTO DIFF WBC: CPT

## 2024-03-08 PROCEDURE — 36415 COLL VENOUS BLD VENIPUNCTURE: CPT

## 2024-03-08 PROCEDURE — 80053 COMPREHEN METABOLIC PANEL: CPT

## 2024-03-08 NOTE — PROGRESS NOTES
[facility]    CONSULTING IN BLOOD DISORDERS & CANCER      REASON FOR CONSULTATION/CHIEF COMPLAINT:     Evaluation and management for left leg DVT, IVC thrombus and bilateral pulmonary embolism                             REQUESTING PHYSICIAN: No ref. provider found  RECORDS OBTAINED:  Records of the patients history including those from the electronic medical record were reviewed and summarized in detail.    HISTORY OF PRESENT ILLNESS:    The patient is a 18 y.o. year old male  with no major medical history had presented to [facility] ER on 8/25/2022 with worsening left leg pain.  A Doppler in the ER noted extensive DVT tracking up the entire left lower extremity up to the IVC.  A CT angiogram of the chest/abdomen/pelvis/lower extremities noted small bilateral pulmonary thromboemboli, with normal RV/LV ratio.  The IVC inferior to the right renal vein filled with thrombus as are both common iliac veins, left internal and external iliac, common femoral, superficial and profunda femoral, and popliteal veins.  On the right, there is thrombus in the distal right common iliac vein.  There is also thrombus within the right superficial femoral and likely right popliteal vein.     Patient was started on anticoagulation with heparin drip and vascular surgery consulted.  Patient underwent bilateral lower extremity venous thrombectomy, IVC thrombectomy and venogram on 8/26/2022 by Dr. Juan.  Hematology has been consulted for further assistance in management.     Patient denies any personal or family history of thrombosis.  He is morbidly obese, BMI 44.7.  Says he usually has sedentary lifestyle.  Lives with his parents.  Spends most of the time sitting/playing video games.    Given his elevated BMI, plan made to start therapeutic anticoagulation with Coumadin as outpatient, goal INR 2-3.    Hypercoagulable work-up performed in August 2022: Negative for factor V Leiden mutation, prothrombin gene mutation.  Low  protein C and Antithrombin 3 level (likely acute thrombosis and anticoagulation related).  Normal protein S level.    December 2022: Having difficulty maintaining therapeutic INR.  Switched back to Xarelto.    INTERIM HISTORY:  Patient returns to the clinic for a follow-up visit.  He remains on Xarelto 20 mg daily.  Tolerating it well.  No excessive bruises or bleeding.  Weight overall stable.  He was recently seen by endocrinology and septal bone prescribed.  Not really at due to insurance issues.  He does walk a couple of times per day.  Reports of having tightness and swelling in right lower extremity around ankles with excessive walking.  He has not been using the compression stockings and we discussed the importance of this.    Past Medical History:   Diagnosis Date    Asthma     History of blood clots 2022    multiple clots of legs, groin, abd, lungs    History of DVT (deep vein thrombosis) 2022    History of pulmonary embolism 2022     Past Surgical History:   Procedure Laterality Date    LYTIC THROMBIN THERAPY Bilateral 8/26/2022    Procedure: BILATERAL LOWER EXTREMITY VENOUS THROMBECTOMY, INFERIOR VENA CAVA THROMBECTOMY, VENOGRAM;  Surgeon: Ita Juan MD;  Location: [facility] 18/19;  Service: Vascular;  Laterality: Bilateral;       MEDICATIONS    Current Outpatient Medications:     rivaroxaban (XARELTO) 20 MG tablet, Take 1 tablet by mouth Daily With Dinner., Disp: 90 tablet, Rfl: 2    Tirzepatide-Weight Management (ZEPBOUND) 2.5 MG/0.5ML solution auto-injector, Inject 0.5 mL under the skin into the appropriate area as directed 1 (One) Time Per Week., Disp: 2 mL, Rfl: 5    ALLERGIES:   No Known Allergies    SOCIAL HISTORY:       Social History     Socioeconomic History    Marital status: Single   Tobacco Use    Smoking status: Never    Smokeless tobacco: Never   Vaping Use    Vaping status: Never Used   Substance and Sexual Activity    Alcohol use: Never    Drug use: Never    Sexual  "activity: Defer         FAMILY HISTORY:  Family History   Problem Relation Age of Onset    Hyperlipidemia Father        REVIEW OF SYSTEMS:  As per HPI        Vitals:    03/08/24 1155   BP: 134/81   Pulse: 95   Resp: 16   Temp: 98 °F (36.7 °C)   TempSrc: Temporal   SpO2: 98%   Weight: (!) 153 kg (336 lb 9.6 oz)   Height: 188 cm (74.02\")   PainSc: 0-No pain         3/8/2024    11:50 AM   Current Status   ECOG score 0      PHYSICAL EXAM:    CONSTITUTIONAL:  Vital signs reviewed.  No distress, looks comfortable.  EYES:  Conjunctiva and lids unremarkable.   EARS,NOSE,MOUTH,THROAT:  Ears and nose appear unremarkable.    RESPIRATORY:  Normal respiratory effort.  Lungs clear to auscultation bilaterally.  CARDIOVASCULAR:  Normal S1, S2.  No murmurs rubs or gallops.  No significant lower extremity edema.  GASTROINTESTINAL: Abdomen appears unremarkable.  Obese abdomen  LYMPHATIC:  No cervical, supraclavicular lymphadenopathy.  NEURO: AAOx3, no focal deficits.  Appears to have equal strength all 4 extremities.  MUSCULOSKELETAL:  Unremarkable digits/nails.  No cyanosis or clubbing.  No apparent joint deformities.  SKIN:  Warm.  No rashes  PSYCHIATRIC:  Normal judgment and insight.  Normal mood and affect.     RECENT LABS:  Reviewed    ASSESSMENT:  Mr. Elizabeth is a pleasant 18-year-old male.     # Bilateral lower extremity DVT, bilateral pulmonary embolism, pelvic veins and IVC thrombus:  Patient had with presented with significant thrombosis involving lungs,  bilateral lower extremity, pelvic veins and IVC in end of August 2022.  He underwent bilateral lower extremity venous thrombectomy, IVC thrombectomy and venogram on 8/26/2022 by Dr. Juan.  This massive thrombus appears to be unprovoked, however certainly contributed by morbid obesity and sedentary lifestyle.  No personal or family history of thrombosis.   A hypercoagulable work-up was negative for factor V Leiden mutation, prothrombin gene mutation and antiphospholipid " syndrome.  Normal protein S level.  Low protein C and Antithrombin 3 levels (likely thrombus and anticoagulation related)  Given his BMI, a DOAC was not considered an ideal option.  Patient was started on therapeutic anticoagulation with Coumadin.  Goal INR 2-3.  12/9/2022: Patient continues to have difficulty maintaining therapeutic INR.  Reviewed recent data from multiple meta-analysis which show comparable efficacy of DOAC's in obese patients as well.  (Comparative efectiveness and safety of direct oral anticoagulants compared to warfarin in morbidly obese patients with acute venous thromboembolism: systematic review and a meta-analysis, Journal of thrombosis and thrombolysis, June 2020; [place]-Analysis Comparing Direct Oral Anticoagulants Versus Warfarin in Morbidly Obese Patients With Atrial Fibrillation, American Journal of cardiology, 2020)  Discussed plan to stop Coumadin and switch to Xarelto (Dec 2022)  The Doppler legs from 9/5/2023 show chronic DVT in the left lower leg in the common femoral, mid femoral and popliteal veins.  All other veins appeared normal bilaterally.   3/8/2023: Continues to do well on Xarelto 20 mg daily.  No bleed or excessive bruises.  Recent  Labs stable.  Patient is working on reducing his weight.  Zepbound prescribed by endocrinology, not received yet due to insurance issues.  Discussed plan to continue Xarelto 20 mg daily for now.  Follow-up in 6 months with repeat labs.    #Bilateral lower extremity swelling: Likely lymphedema/thrombosis related.  Recommended compression stockings.  Advised to maintain close follow-up with vascular surgery/lymphedema clinic.    #Morbid obesity, BMI 44.7:  Patient's (Body mass index is 43.2 kg/m².) indicates that they are morbidly obese (BMI > 40 or > 35 with obesity - related health condition) with health related conditions that include none . Weight is unchanged. BMI is is above average; no BMI management plan is appropriate. We discussed  portion control and increasing exercise.   Following up with endocrinology    PLAN:  -Massive DVT, PE and abdominal veins thrombosis.  Unprovoked.  Hypercoagulable work-up negative.  Will need long-term anticoagulation.  -Continue Xarelto 20 mg daily  -Encouraged use of compression stockings  -Advised follow-up with vascular surgery and endocrinology  -Follow-up in 6 months  or sooner if needed  Orders Placed This Encounter   Procedures    Comprehensive Metabolic Panel     Standing Status:   Future     Standing Expiration Date:   3/8/2025     Order Specific Question:   Release to patient     Answer:   Routine Release [[id number]]    D-dimer, Quantitative     Standing Status:   Future     Standing Expiration Date:   3/8/2025     Order Specific Question:   Release to patient     Answer:   Routine Release [[id number]]    CBC & Differential     Standing Status:   Future     Standing Expiration Date:   3/8/2025     Order Specific Question:   Manual Differential     Answer:   No     Order Specific Question:   Release to patient     Answer:   Routine Release [[id number]]   Total time spent during this patient encounter is 35 minutes. The total time spent with the patient includes: preparing to see the patient by reviewing of tests, prior notes or other relevant information, performing appropriate independent examination & evaluation, counseling, ordering of medications, tests or procedures, documenting clinic information in the electronic medical records or other health records, independently interpreting results of tests and communicating the results to the patient/family or caregiver.

## 2024-05-09 RX ORDER — RIVAROXABAN 20 MG/1
20 TABLET, FILM COATED ORAL
Qty: 90 TABLET | Refills: 2 | Status: SHIPPED | OUTPATIENT
Start: 2024-05-09

## 2024-08-16 ENCOUNTER — OFFICE VISIT (OUTPATIENT)
Dept: ENDOCRINOLOGY | Age: 19
End: 2024-08-16
Payer: COMMERCIAL

## 2024-08-16 VITALS
HEART RATE: 92 BPM | WEIGHT: 315 LBS | BODY MASS INDEX: 40.43 KG/M2 | DIASTOLIC BLOOD PRESSURE: 86 MMHG | OXYGEN SATURATION: 99 % | SYSTOLIC BLOOD PRESSURE: 128 MMHG | HEIGHT: 74 IN

## 2024-08-16 DIAGNOSIS — E66.01 OBESITY, CLASS III, BMI 40-49.9 (MORBID OBESITY): Primary | ICD-10-CM

## 2024-08-16 PROCEDURE — 99213 OFFICE O/P EST LOW 20 MIN: CPT | Performed by: INTERNAL MEDICINE

## 2024-08-16 PROCEDURE — 1160F RVW MEDS BY RX/DR IN RCRD: CPT | Performed by: INTERNAL MEDICINE

## 2024-08-16 PROCEDURE — 1159F MED LIST DOCD IN RCRD: CPT | Performed by: INTERNAL MEDICINE

## 2024-08-16 RX ORDER — SEMAGLUTIDE 0.25 MG/.5ML
0.25 INJECTION, SOLUTION SUBCUTANEOUS WEEKLY
Qty: 2 ML | Refills: 5 | Status: SHIPPED | OUTPATIENT
Start: 2024-08-16

## 2024-08-16 NOTE — PROGRESS NOTES
Chief complaint/Reason for consult: obesity    HPI:   - 19 year old male here for obesity  - Last seen in 2/2024  - No changes since last visit  - He does walk for 1 hour every day but has not changed his diet significantly    The following portions of the patient's history were reviewed and updated as appropriate: allergies, current medications, past family history, past medical history, past social history, past surgical history, and problem list.      Objective     Vitals:    08/16/24 1026   BP: 128/86   Pulse: 92   SpO2: 99%        Physical Exam  Vitals reviewed.   Constitutional:       Appearance: Normal appearance.   HENT:      Head: Normocephalic and atraumatic.   Eyes:      General: No scleral icterus.  Pulmonary:      Effort: Pulmonary effort is normal. No respiratory distress.   Neurological:      Mental Status: He is alert.      Gait: Gait normal.   Psychiatric:         Mood and Affect: Mood normal.         Behavior: Behavior normal.         Thought Content: Thought content normal.         Judgment: Judgment normal.         Assessment & Plan   Obesity (BMI of 43.6)  - Discussed dietary changes that would help with weight loss  - Will also prescribe Wegovy 0.25 mg weekly    - Return to clinic in 6 months

## 2024-08-23 ENCOUNTER — PRIOR AUTHORIZATION (OUTPATIENT)
Dept: ENDOCRINOLOGY | Age: 19
End: 2024-08-23
Payer: COMMERCIAL

## 2024-08-26 NOTE — TELEPHONE ENCOUNTER
This request has not been approved. Based on the information sent for review, the requested drug did not meet our guideline rules.

## 2024-09-13 ENCOUNTER — LAB (OUTPATIENT)
Dept: LAB | Facility: HOSPITAL | Age: 19
End: 2024-09-13
Payer: COMMERCIAL

## 2024-09-13 ENCOUNTER — OFFICE VISIT (OUTPATIENT)
Dept: ONCOLOGY | Facility: CLINIC | Age: 19
End: 2024-09-13
Payer: COMMERCIAL

## 2024-09-13 VITALS
RESPIRATION RATE: 19 BRPM | WEIGHT: 315 LBS | HEIGHT: 74 IN | SYSTOLIC BLOOD PRESSURE: 134 MMHG | DIASTOLIC BLOOD PRESSURE: 81 MMHG | OXYGEN SATURATION: 100 % | HEART RATE: 102 BPM | BODY MASS INDEX: 40.43 KG/M2

## 2024-09-13 DIAGNOSIS — I26.99 BILATERAL PULMONARY EMBOLISM: ICD-10-CM

## 2024-09-13 DIAGNOSIS — I82.4Y2 DVT, LOWER EXTREMITY, PROXIMAL, ACUTE, LEFT: Primary | ICD-10-CM

## 2024-09-13 DIAGNOSIS — I82.4Y2 DVT, LOWER EXTREMITY, PROXIMAL, ACUTE, LEFT: ICD-10-CM

## 2024-09-13 DIAGNOSIS — E66.01 OBESITY, CLASS III, BMI 40-49.9 (MORBID OBESITY): ICD-10-CM

## 2024-09-13 LAB
ALBUMIN SERPL-MCNC: 4.5 G/DL (ref 3.5–5.2)
ALBUMIN/GLOB SERPL: 1.3 G/DL
ALP SERPL-CCNC: 66 U/L (ref 39–117)
ALT SERPL W P-5'-P-CCNC: 21 U/L (ref 1–41)
ANION GAP SERPL CALCULATED.3IONS-SCNC: 11.6 MMOL/L (ref 5–15)
AST SERPL-CCNC: 15 U/L (ref 1–40)
BASOPHILS # BLD AUTO: 0.04 10*3/MM3 (ref 0–0.2)
BASOPHILS NFR BLD AUTO: 0.4 % (ref 0–1.5)
BILIRUB SERPL-MCNC: 0.3 MG/DL (ref 0–1.2)
BUN SERPL-MCNC: 9 MG/DL (ref 6–20)
BUN/CREAT SERPL: 10.3 (ref 7–25)
CALCIUM SPEC-SCNC: 9.2 MG/DL (ref 8.6–10.5)
CHLORIDE SERPL-SCNC: 101 MMOL/L (ref 98–107)
CO2 SERPL-SCNC: 25.4 MMOL/L (ref 22–29)
CREAT SERPL-MCNC: 0.87 MG/DL (ref 0.76–1.27)
D DIMER PPP FEU-MCNC: <0.27 MCGFEU/ML (ref 0–0.5)
DEPRECATED RDW RBC AUTO: 38.6 FL (ref 37–54)
EGFRCR SERPLBLD CKD-EPI 2021: 125.9 ML/MIN/1.73
EOSINOPHIL # BLD AUTO: 0.09 10*3/MM3 (ref 0–0.4)
EOSINOPHIL NFR BLD AUTO: 0.9 % (ref 0.3–6.2)
ERYTHROCYTE [DISTWIDTH] IN BLOOD BY AUTOMATED COUNT: 12.2 % (ref 12.3–15.4)
GLOBULIN UR ELPH-MCNC: 3.5 GM/DL
GLUCOSE SERPL-MCNC: 94 MG/DL (ref 65–99)
HCT VFR BLD AUTO: 48.2 % (ref 37.5–51)
HGB BLD-MCNC: 16.1 G/DL (ref 13–17.7)
IMM GRANULOCYTES # BLD AUTO: 0.02 10*3/MM3 (ref 0–0.05)
IMM GRANULOCYTES NFR BLD AUTO: 0.2 % (ref 0–0.5)
LYMPHOCYTES # BLD AUTO: 4.06 10*3/MM3 (ref 0.7–3.1)
LYMPHOCYTES NFR BLD AUTO: 39.3 % (ref 19.6–45.3)
MCH RBC QN AUTO: 29 PG (ref 26.6–33)
MCHC RBC AUTO-ENTMCNC: 33.4 G/DL (ref 31.5–35.7)
MCV RBC AUTO: 86.8 FL (ref 79–97)
MONOCYTES # BLD AUTO: 0.87 10*3/MM3 (ref 0.1–0.9)
MONOCYTES NFR BLD AUTO: 8.4 % (ref 5–12)
NEUTROPHILS NFR BLD AUTO: 5.26 10*3/MM3 (ref 1.7–7)
NEUTROPHILS NFR BLD AUTO: 50.8 % (ref 42.7–76)
NRBC BLD AUTO-RTO: 0 /100 WBC (ref 0–0.2)
PLATELET # BLD AUTO: 370 10*3/MM3 (ref 140–450)
PMV BLD AUTO: 9.8 FL (ref 6–12)
POTASSIUM SERPL-SCNC: 4.3 MMOL/L (ref 3.5–5.2)
PROT SERPL-MCNC: 8 G/DL (ref 6–8.5)
RBC # BLD AUTO: 5.55 10*6/MM3 (ref 4.14–5.8)
SODIUM SERPL-SCNC: 138 MMOL/L (ref 136–145)
WBC NRBC COR # BLD AUTO: 10.34 10*3/MM3 (ref 3.4–10.8)

## 2024-09-13 PROCEDURE — 80053 COMPREHEN METABOLIC PANEL: CPT

## 2024-09-13 PROCEDURE — 85379 FIBRIN DEGRADATION QUANT: CPT | Performed by: INTERNAL MEDICINE

## 2024-09-13 PROCEDURE — 85025 COMPLETE CBC W/AUTO DIFF WBC: CPT

## 2024-09-13 PROCEDURE — 36415 COLL VENOUS BLD VENIPUNCTURE: CPT

## 2024-09-13 NOTE — PROGRESS NOTES
[facility]    CONSULTING IN BLOOD DISORDERS & CANCER      REASON FOR CONSULTATION/CHIEF COMPLAINT:     Evaluation and management for left leg DVT, IVC thrombus and bilateral pulmonary embolism                             REQUESTING PHYSICIAN: No ref. provider found  RECORDS OBTAINED:  Records of the patients history including those from the electronic medical record were reviewed and summarized in detail.    HISTORY OF PRESENT ILLNESS:    The patient is a 19 y.o. year old male  with no major medical history had presented to [facility] ER on 8/25/2022 with worsening left leg pain.  A Doppler in the ER noted extensive DVT tracking up the entire left lower extremity up to the IVC.  A CT angiogram of the chest/abdomen/pelvis/lower extremities noted small bilateral pulmonary thromboemboli, with normal RV/LV ratio.  The IVC inferior to the right renal vein filled with thrombus as are both common iliac veins, left internal and external iliac, common femoral, superficial and profunda femoral, and popliteal veins.  On the right, there is thrombus in the distal right common iliac vein.  There is also thrombus within the right superficial femoral and likely right popliteal vein.     Patient was started on anticoagulation with heparin drip and vascular surgery consulted.  Patient underwent bilateral lower extremity venous thrombectomy, IVC thrombectomy and venogram on 8/26/2022 by Dr. Juan.  Hematology has been consulted for further assistance in management.     Patient denies any personal or family history of thrombosis.  He is morbidly obese, BMI 44.7.  Says he usually has sedentary lifestyle.  Lives with his parents.  Spends most of the time sitting/playing video games.    Given his elevated BMI, plan made to start therapeutic anticoagulation with Coumadin as outpatient, goal INR 2-3.    Hypercoagulable work-up performed in August 2022: Negative for factor V Leiden mutation, prothrombin gene mutation.  Low  protein C and Antithrombin 3 level (likely acute thrombosis and anticoagulation related).  Normal protein S level.    December 2022: Having difficulty maintaining therapeutic INR.  Switched back to Xarelto.    INTERIM HISTORY:  Patient returns to the clinic for a follow-up visit.  He remains on Xarelto 20 mg daily.  Tolerating it well.  No excessive bruises or bleeding.  Weight overall stable.  He was recently seen by endocrinology and Wegovy prescribed.  However, he has not been able to receive it due to insurance denial.  He is trying to appeal it.  He does walk a couple of times per day.  He denies any leg pain or swelling.  He has not been using the compression stockings     Past Medical History:   Diagnosis Date    Asthma     History of blood clots 2022    multiple clots of legs, groin, abd, lungs    History of DVT (deep vein thrombosis) 2022    History of pulmonary embolism 2022     Past Surgical History:   Procedure Laterality Date    LYTIC THROMBIN THERAPY Bilateral 8/26/2022    Procedure: BILATERAL LOWER EXTREMITY VENOUS THROMBECTOMY, INFERIOR VENA CAVA THROMBECTOMY, VENOGRAM;  Surgeon: Ita Juan MD;  Location: [facility] 18/19;  Service: Vascular;  Laterality: Bilateral;       MEDICATIONS    Current Outpatient Medications:     Semaglutide-Weight Management (Wegovy) 0.25 MG/0.5ML solution auto-injector, Inject 0.5 mL under the skin into the appropriate area as directed 1 (One) Time Per Week., Disp: 2 mL, Rfl: 5    Xarelto 20 MG tablet, TAKE 1 TABLET BY MOUTH DAILY WITH DINNER, Disp: 90 tablet, Rfl: 2    ALLERGIES:   No Known Allergies    SOCIAL HISTORY:       Social History     Socioeconomic History    Marital status: Single   Tobacco Use    Smoking status: Never    Smokeless tobacco: Never   Vaping Use    Vaping status: Never Used   Substance and Sexual Activity    Alcohol use: Never    Drug use: Never    Sexual activity: Defer         FAMILY HISTORY:  Family History   Problem Relation Age  "of Onset    Hyperlipidemia Father        REVIEW OF SYSTEMS:  As per HPI        Vitals:    09/13/24 1019   BP: 134/81   Pulse: 102   Resp: 19   SpO2: 100%   Weight: (!) 153 kg (338 lb)   Height: 188 cm (74.02\")   PainSc: 0-No pain         9/13/2024    10:19 AM   Current Status   ECOG score 0      PHYSICAL EXAM:    CONSTITUTIONAL:  Vital signs reviewed.  No distress, looks comfortable.  EYES:  Conjunctiva and lids unremarkable.   EARS,NOSE,MOUTH,THROAT:  Ears and nose appear unremarkable.    RESPIRATORY:  Normal respiratory effort.  Lungs clear to auscultation bilaterally.  CARDIOVASCULAR:  Normal S1, S2.  No murmurs rubs or gallops.  No significant lower extremity edema.  GASTROINTESTINAL: Abdomen appears unremarkable.  Obese abdomen  LYMPHATIC:  No cervical, supraclavicular lymphadenopathy.  NEURO: AAOx3, no focal deficits.  Appears to have equal strength all 4 extremities.  MUSCULOSKELETAL:  Unremarkable digits/nails.  No cyanosis or clubbing.  No apparent joint deformities.  SKIN:  Warm.  No rashes  PSYCHIATRIC:  Normal judgment and insight.  Normal mood and affect.     RECENT LABS:  Reviewed    ASSESSMENT:  Mr. Elizabeth is a pleasant 19-year-old male.     # Bilateral lower extremity DVT, bilateral pulmonary embolism, pelvic veins and IVC thrombus:  Patient had with presented with significant thrombosis involving lungs,  bilateral lower extremity, pelvic veins and IVC in end of August 2022.  He underwent bilateral lower extremity venous thrombectomy, IVC thrombectomy and venogram on 8/26/2022 by Dr. Juan.  This massive thrombus appears to be unprovoked, however certainly contributed by morbid obesity and sedentary lifestyle.  No personal or family history of thrombosis.   A hypercoagulable work-up was negative for factor V Leiden mutation, prothrombin gene mutation and antiphospholipid syndrome.  Normal protein S level.  Low protein C and Antithrombin 3 levels (likely thrombus and anticoagulation " related)  Given his BMI, a DOAC was not considered an ideal option.  Patient was started on therapeutic anticoagulation with Coumadin.  Goal INR 2-3.  12/9/2022: Patient continues to have difficulty maintaining therapeutic INR.  Reviewed recent data from multiple meta-analysis which show comparable efficacy of DOAC's in obese patients as well.  (Comparative efectiveness and safety of direct oral anticoagulants compared to warfarin in morbidly obese patients with acute venous thromboembolism: systematic review and a meta-analysis, Journal of thrombosis and thrombolysis, June 2020; [place]-Analysis Comparing Direct Oral Anticoagulants Versus Warfarin in Morbidly Obese Patients With Atrial Fibrillation, American Journal of cardiology, 2020)  Discussed plan to stop Coumadin and switch to Xarelto (Dec 2022)  The Doppler legs from 9/5/2023 show chronic DVT in the left lower leg in the common femoral, mid femoral and popliteal veins.  All other veins appeared normal bilaterally.   9/13/2023: Continues to do well on Xarelto 20 mg daily.  No bleed or excessive bruises.  Recent  Labs stable.  Patient is working on reducing his weight.  Zepbound prescribed by endocrinology, not received yet due to insurance issues.  Discussed plan to continue Xarelto 20 mg daily for now.  Follow-up in 6 months with repeat labs.    #Bilateral lower extremity swelling: Likely lymphedema/thrombosis related.  Recommended compression stockings.  Advised to maintain close follow-up with vascular surgery/lymphedema clinic.    #Morbid obesity:  Patient's (Body mass index is 43.38 kg/m².) indicates that they are morbidly obese (BMI > 40 or > 35 with obesity - related health condition) with health related conditions that include none . Weight is unchanged. BMI is is above average; no BMI management plan is appropriate. We discussed portion control and increasing exercise.   Following up with endocrinology    PLAN:  -Massive DVT, PE and abdominal veins  thrombosis.  Unprovoked.  Hypercoagulable work-up negative.  Will need long-term anticoagulation.  -Continue Xarelto 20 mg daily  -Encouraged use of compression stockings if develops leg swelling  -Advised follow-up with vascular surgery and at endocrinology  -Follow-up in 6 months  or sooner if needed  Orders Placed This Encounter   Procedures    D-dimer, Quantitative     Standing Status:   Future     Standing Expiration Date:   9/13/2025     Order Specific Question:   Release to patient     Answer:   Routine Release [[id number]]    Comprehensive Metabolic Panel     Standing Status:   Future     Standing Expiration Date:   9/13/2025     Order Specific Question:   Release to patient     Answer:   Routine Release [[id number]]    CBC & Differential     Standing Status:   Future     Standing Expiration Date:   9/13/2025     Order Specific Question:   Manual Differential     Answer:   No     Order Specific Question:   Release to patient     Answer:   Routine Release [[id number]]   Total time spent during this patient encounter is 32 minutes. The total time spent with the patient includes: preparing to see the patient by reviewing of tests, prior notes or other relevant information, performing appropriate independent examination & evaluation, counseling, ordering of medications, tests or procedures, documenting clinic information in the electronic medical records or other health records, independently interpreting results of tests and communicating the results to the patient/family or caregiver.

## 2025-03-18 NOTE — PROGRESS NOTES
[facility]    CONSULTING IN BLOOD DISORDERS & CANCER      REASON FOR FOLLOW UP/CHIEF COMPLAINT:     Evaluation and management for left leg DVT, IVC thrombus and bilateral pulmonary embolism                             REQUESTING PHYSICIAN: No ref. provider found  RECORDS OBTAINED:  Records of the patients history including those from the electronic medical record were reviewed and summarized in detail.    HISTORY OF PRESENT ILLNESS:    The patient is a 19 y.o. year old male  with no major medical history had presented to [facility] ER on 8/25/2022 with worsening left leg pain.  A Doppler in the ER noted extensive DVT tracking up the entire left lower extremity up to the IVC.  A CT angiogram of the chest/abdomen/pelvis/lower extremities noted small bilateral pulmonary thromboemboli, with normal RV/LV ratio.  The IVC inferior to the right renal vein filled with thrombus as are both common iliac veins, left internal and external iliac, common femoral, superficial and profunda femoral, and popliteal veins.  On the right, there is thrombus in the distal right common iliac vein.  There is also thrombus within the right superficial femoral and likely right popliteal vein.     Patient was started on anticoagulation with heparin drip and vascular surgery consulted.  Patient underwent bilateral lower extremity venous thrombectomy, IVC thrombectomy and venogram on 8/26/2022 by Dr. Juan.  Hematology has been consulted for further assistance in management.     Patient denies any personal or family history of thrombosis.  He is morbidly obese, BMI 44.7.  Says he usually has sedentary lifestyle.  Lives with his parents.  Spends most of the time sitting/playing video games.    Given his elevated BMI, plan made to start therapeutic anticoagulation with Coumadin as outpatient, goal INR 2-3.    Hypercoagulable work-up performed in August 2022: Negative for factor V Leiden mutation, prothrombin gene mutation.  Low  protein C and Antithrombin 3 level (likely acute thrombosis and anticoagulation related).  Normal protein S level.    December 2022: Having difficulty maintaining therapeutic INR.  Switched back to Xarelto.    INTERIM HISTORY:  The patient returns today for follow up.  She continues Xarelto 20 mg daily.  He is tolerating Xarelto without any signs or symptoms of bleeding.  He denies any new lower extremity edema, calf pain, chest pain, shortness of breath.  No new concerns today.    Past Medical History:   Diagnosis Date    Asthma     History of blood clots 2022    multiple clots of legs, groin, abd, lungs    History of DVT (deep vein thrombosis) 2022    History of pulmonary embolism 2022     Past Surgical History:   Procedure Laterality Date    LYTIC THROMBIN THERAPY Bilateral 8/26/2022    Procedure: BILATERAL LOWER EXTREMITY VENOUS THROMBECTOMY, INFERIOR VENA CAVA THROMBECTOMY, VENOGRAM;  Surgeon: Ita Juan MD;  Location: [facility] 18/19;  Service: Vascular;  Laterality: Bilateral;       MEDICATIONS    Current Outpatient Medications:     rivaroxaban (Xarelto) 20 MG tablet, Take 1 tablet by mouth Daily With Dinner., Disp: 90 tablet, Rfl: 2    Semaglutide-Weight Management (Wegovy) 0.25 MG/0.5ML solution auto-injector, Inject 0.5 mL under the skin into the appropriate area as directed 1 (One) Time Per Week. (Patient not taking: Reported on 3/21/2025), Disp: 2 mL, Rfl: 5    ALLERGIES:   No Known Allergies    SOCIAL HISTORY:       Social History     Socioeconomic History    Marital status: Single   Tobacco Use    Smoking status: Never    Smokeless tobacco: Never   Vaping Use    Vaping status: Never Used   Substance and Sexual Activity    Alcohol use: Never    Drug use: Never    Sexual activity: Defer         FAMILY HISTORY:  Family History   Problem Relation Age of Onset    Hyperlipidemia Father        REVIEW OF SYSTEMS:  As per HPI        Vitals:    03/21/25 1510   BP: 138/79   Pulse: 109   Resp: 16  "  Temp: 98 °F (36.7 °C)   TempSrc: Oral   SpO2: 97%   Weight: (!) 152 kg (336 lb 3.2 oz)   Height: 188 cm (74.02\")   PainSc: 0-No pain           3/21/2025     3:09 PM   Current Status   ECOG score 0      PHYSICAL EXAM:    CONSTITUTIONAL:  Vital signs reviewed.  No distress, looks comfortable.  EYES:  Conjunctiva and lids unremarkable.   EARS,NOSE,MOUTH,THROAT:  Ears and nose appear unremarkable.    RESPIRATORY:  Normal respiratory effort.  Lungs clear to auscultation bilaterally.  CARDIOVASCULAR:  Normal S1, S2.  No murmurs rubs or gallops.  No significant lower extremity edema.  GASTROINTESTINAL: Abdomen appears unremarkable.  Obese abdomen  LYMPHATIC:  No cervical, supraclavicular lymphadenopathy.  NEURO: AAOx3, no focal deficits.  Appears to have equal strength all 4 extremities.  MUSCULOSKELETAL:  Unremarkable digits/nails.  No cyanosis or clubbing.  No apparent joint deformities.  SKIN:  Warm.  No rashes  PSYCHIATRIC:  Normal judgment and insight.  Normal mood and affect.     RECENT LABS:  Reviewed    ASSESSMENT:  Mr. Elizabeth is a pleasant 19-year-old male.     # Bilateral lower extremity DVT, bilateral pulmonary embolism, pelvic veins and IVC thrombus:  Patient had with presented with significant thrombosis involving lungs,  bilateral lower extremity, pelvic veins and IVC in end of August 2022.  He underwent bilateral lower extremity venous thrombectomy, IVC thrombectomy and venogram on 8/26/2022 by Dr. Juan.  This massive thrombus appears to be unprovoked, however certainly contributed by morbid obesity and sedentary lifestyle.  No personal or family history of thrombosis.   A hypercoagulable work-up was negative for factor V Leiden mutation, prothrombin gene mutation and antiphospholipid syndrome.  Normal protein S level.  Low protein C and Antithrombin 3 levels (likely thrombus and anticoagulation related)  Given his BMI, a DOAC was not considered an ideal option.  Patient was started on therapeutic " anticoagulation with Coumadin.  Goal INR 2-3.  12/9/2022: Patient continues to have difficulty maintaining therapeutic INR.  Reviewed recent data from multiple meta-analysis which show comparable efficacy of DOAC's in obese patients as well.  (Comparative efectiveness and safety of direct oral anticoagulants compared to warfarin in morbidly obese patients with acute venous thromboembolism: systematic review and a meta-analysis, Journal of thrombosis and thrombolysis, June 2020; [place]-Analysis Comparing Direct Oral Anticoagulants Versus Warfarin in Morbidly Obese Patients With Atrial Fibrillation, American Journal of cardiology, 2020)  Discussed plan to stop Coumadin and switch to Xarelto (Dec 2022)  The Doppler legs from 9/5/2023 show chronic DVT in the left lower leg in the common femoral, mid femoral and popliteal veins.  All other veins appeared normal bilaterally.   9/13/2023: Continues to do well on Xarelto 20 mg daily.  No bleed or excessive bruises.  Recent  Labs stable.  Patient is working on reducing his weight.  Zepbound prescribed by endocrinology, not received yet due to insurance issues.  Discussed plan to continue Xarelto 20 mg daily for now.  Follow-up in 6 months with repeat labs.  3/21/2025: Continues Xarelto 20 mg daily, tolerating without signs or symptoms of bleeding.      #Bilateral lower extremity swelling: Likely lymphedema/thrombosis related.  Recommended compression stockings.  Advised to maintain close follow-up with vascular surgery/lymphedema clinic.    #Morbid obesity:  Patient's (Body mass index is 43.15 kg/m².) indicates that they are morbidly obese (BMI > 40 or > 35 with obesity - related health condition) with health related conditions that include none . Weight is unchanged. BMI is is above average; no BMI management plan is appropriate. We discussed portion control and increasing exercise.   Following up with endocrinology    PLAN:   -Massive DVT, PE and abdominal veins thrombosis.   Unprovoked.  Hypercoagulable work-up negative.  Will need long-term anticoagulation.  -Continue Xarelto 20 mg daily.  Refill sent to pharmacy today.  -Encouraged use of compression stockings if develops leg swelling  -Continue follow-up with vascular surgery and endocrinology  -Follow-up in 6 months  or sooner if needed  No orders of the defined types were placed in this encounter.

## 2025-03-21 ENCOUNTER — OFFICE VISIT (OUTPATIENT)
Dept: ONCOLOGY | Facility: CLINIC | Age: 20
End: 2025-03-21
Payer: COMMERCIAL

## 2025-03-21 ENCOUNTER — LAB (OUTPATIENT)
Dept: LAB | Facility: HOSPITAL | Age: 20
End: 2025-03-21
Payer: COMMERCIAL

## 2025-03-21 VITALS
HEIGHT: 74 IN | TEMPERATURE: 98 F | RESPIRATION RATE: 16 BRPM | BODY MASS INDEX: 40.43 KG/M2 | OXYGEN SATURATION: 97 % | HEART RATE: 109 BPM | DIASTOLIC BLOOD PRESSURE: 79 MMHG | WEIGHT: 315 LBS | SYSTOLIC BLOOD PRESSURE: 138 MMHG

## 2025-03-21 DIAGNOSIS — E66.01 OBESITY, CLASS III, BMI 40-49.9 (MORBID OBESITY): ICD-10-CM

## 2025-03-21 DIAGNOSIS — I82.4Y2 DVT, LOWER EXTREMITY, PROXIMAL, ACUTE, LEFT: Primary | ICD-10-CM

## 2025-03-21 DIAGNOSIS — I82.4Y2 DVT, LOWER EXTREMITY, PROXIMAL, ACUTE, LEFT: ICD-10-CM

## 2025-03-21 DIAGNOSIS — I26.99 BILATERAL PULMONARY EMBOLISM: ICD-10-CM

## 2025-03-21 DIAGNOSIS — Z79.01 CURRENT USE OF LONG TERM ANTICOAGULATION: ICD-10-CM

## 2025-03-21 LAB
ALBUMIN SERPL-MCNC: 4.5 G/DL (ref 3.5–5.2)
ALBUMIN/GLOB SERPL: 1.5 G/DL
ALP SERPL-CCNC: 58 U/L (ref 39–117)
ALT SERPL W P-5'-P-CCNC: 32 U/L (ref 1–41)
ANION GAP SERPL CALCULATED.3IONS-SCNC: 11 MMOL/L (ref 5–15)
AST SERPL-CCNC: 19 U/L (ref 1–40)
BASOPHILS # BLD AUTO: 0.03 10*3/MM3 (ref 0–0.2)
BASOPHILS NFR BLD AUTO: 0.5 % (ref 0–1.5)
BILIRUB SERPL-MCNC: 0.5 MG/DL (ref 0–1.2)
BUN SERPL-MCNC: 10 MG/DL (ref 6–20)
BUN/CREAT SERPL: 11.2 (ref 7–25)
CALCIUM SPEC-SCNC: 9.7 MG/DL (ref 8.6–10.5)
CHLORIDE SERPL-SCNC: 103 MMOL/L (ref 98–107)
CO2 SERPL-SCNC: 24 MMOL/L (ref 22–29)
CREAT SERPL-MCNC: 0.89 MG/DL (ref 0.76–1.27)
D DIMER PPP FEU-MCNC: <0.27 MCGFEU/ML (ref 0–0.5)
DEPRECATED RDW RBC AUTO: 39.8 FL (ref 37–54)
EGFRCR SERPLBLD CKD-EPI 2021: 125 ML/MIN/1.73
EOSINOPHIL # BLD AUTO: 0.06 10*3/MM3 (ref 0–0.4)
EOSINOPHIL NFR BLD AUTO: 1 % (ref 0.3–6.2)
ERYTHROCYTE [DISTWIDTH] IN BLOOD BY AUTOMATED COUNT: 13.2 % (ref 12.3–15.4)
GLOBULIN UR ELPH-MCNC: 3.1 GM/DL
GLUCOSE SERPL-MCNC: 97 MG/DL (ref 65–99)
HCT VFR BLD AUTO: 47.2 % (ref 37.5–51)
HGB BLD-MCNC: 15.9 G/DL (ref 13–17.7)
IMM GRANULOCYTES # BLD AUTO: 0.01 10*3/MM3 (ref 0–0.05)
IMM GRANULOCYTES NFR BLD AUTO: 0.2 % (ref 0–0.5)
LYMPHOCYTES # BLD AUTO: 2.02 10*3/MM3 (ref 0.7–3.1)
LYMPHOCYTES NFR BLD AUTO: 32.5 % (ref 19.6–45.3)
MCH RBC QN AUTO: 28.1 PG (ref 26.6–33)
MCHC RBC AUTO-ENTMCNC: 33.7 G/DL (ref 31.5–35.7)
MCV RBC AUTO: 83.4 FL (ref 79–97)
MONOCYTES # BLD AUTO: 0.62 10*3/MM3 (ref 0.1–0.9)
MONOCYTES NFR BLD AUTO: 10 % (ref 5–12)
NEUTROPHILS NFR BLD AUTO: 3.47 10*3/MM3 (ref 1.7–7)
NEUTROPHILS NFR BLD AUTO: 55.8 % (ref 42.7–76)
NRBC BLD AUTO-RTO: 0 /100 WBC (ref 0–0.2)
PLATELET # BLD AUTO: 297 10*3/MM3 (ref 140–450)
PMV BLD AUTO: 10 FL (ref 6–12)
POTASSIUM SERPL-SCNC: 4.1 MMOL/L (ref 3.5–5.2)
PROT SERPL-MCNC: 7.6 G/DL (ref 6–8.5)
RBC # BLD AUTO: 5.66 10*6/MM3 (ref 4.14–5.8)
SODIUM SERPL-SCNC: 138 MMOL/L (ref 136–145)
WBC NRBC COR # BLD AUTO: 6.21 10*3/MM3 (ref 3.4–10.8)

## 2025-03-21 PROCEDURE — 85379 FIBRIN DEGRADATION QUANT: CPT | Performed by: INTERNAL MEDICINE

## 2025-03-21 PROCEDURE — 80053 COMPREHEN METABOLIC PANEL: CPT

## 2025-03-21 PROCEDURE — 85025 COMPLETE CBC W/AUTO DIFF WBC: CPT

## 2025-03-21 PROCEDURE — 36415 COLL VENOUS BLD VENIPUNCTURE: CPT

## (undated) DEVICE — TOWEL,OR,DSP,ST,NATURAL,DLX,4/PK,20PK/CS: Brand: MEDLINE

## (undated) DEVICE — BNDG ELAS ELITE V/CLOSE 6IN 5YD LF STRL

## (undated) DEVICE — RADIFOCUS TORQUE DEVICE MULTI-TORQUE VISE: Brand: RADIFOCUS TORQUE DEVICE

## (undated) DEVICE — NAVICROSS SUPPORT CATHETER: Brand: NAVICROSS

## (undated) DEVICE — GW TORQFLX SS .018IN 40CM

## (undated) DEVICE — SUT PROLN 3/0 8832H

## (undated) DEVICE — CLOTTRIEVER CATHETER, 16 MM, 105 CM: Brand: CLOTTRIEVER CATHETER, 16 MM

## (undated) DEVICE — CLOTTRIEVER SHEATH, 16 FR, 15 CM LENGTH: Brand: CLOTTRIEVER SHEATH,  16 FR

## (undated) DEVICE — DESTINATION RENAL GUIDING SHEATH: Brand: DESTINATION

## (undated) DEVICE — GOWN,NON-REINFORCED,SIRUS,SET IN SLV,XXL: Brand: MEDLINE

## (undated) DEVICE — RADIFOCUS GLIDEWIRE: Brand: GLIDEWIRE

## (undated) DEVICE — TBG PRESS 96IN M/F ROT BRAID: Brand: MEDLINE INDUSTRIES, INC.

## (undated) DEVICE — 3M™ IOBAN™ 2 ANTIMICROBIAL INCISE DRAPE 6650EZ: Brand: IOBAN™ 2

## (undated) DEVICE — COVER,MAYO STAND,STERILE: Brand: MEDLINE

## (undated) DEVICE — PK ANGIO 40

## (undated) DEVICE — INFLATION DEVICE: Brand: ENCORE™ 26

## (undated) DEVICE — BNDG ELAS ELITE V/CLOSE 4IN 5YD LF STRL

## (undated) DEVICE — ATLAS®  PTA BALLOON DILATATION CATHETER 16 MM X 40 MM, 75 CM CATHETER: Brand: ATLAS®

## (undated) DEVICE — GLV SURG BIOGEL M LTX PF 6 1/2

## (undated) DEVICE — ATLAS®  PTA BALLOON DILATATION CATHETER 14 MM X 40 MM, 75 CM CATHETER: Brand: ATLAS®

## (undated) DEVICE — RADIFOCUS GLIDEWIRE ADVANTAGE GUIDEWIRE: Brand: GLIDEWIRE ADVANTAGE

## (undated) DEVICE — CVR PROB 96IN LF STRL

## (undated) DEVICE — PINNACLE INTRODUCER SHEATH: Brand: PINNACLE

## (undated) DEVICE — PINNACLE R/O II INTRODUCER SHEATH WITH RADIOPAQUE MARKER: Brand: PINNACLE

## (undated) DEVICE — STPLR SKIN VISISTAT WD 35CT

## (undated) DEVICE — CATH GUIDE SOFTVU SELECT/V HT OMNI .038 5F 65CM

## (undated) DEVICE — CATH TEMPO 5F BER II 65CM: Brand: TEMPO

## (undated) DEVICE — STERILE ULTRASOUND GEL, SAFEWRAP: Brand: ECOVUE

## (undated) DEVICE — KT MINI ACC MAK401

## (undated) DEVICE — CATH FLSH IMPRESS PIG .035IN .049IN/ID 10SD/PRT 5F 65CM

## (undated) DEVICE — TRIEVER 16 EMBOLECTOMY CATHETER, 16 FR, 107 CM LENGTH: Brand: TRIEVER 16

## (undated) DEVICE — SOL NACL 0.9PCT 1000ML

## (undated) DEVICE — Device: Brand: D-STAT® DRY SILVER CLEAR TOPICAL HEMOSTAT

## (undated) DEVICE — SYRINGE KIT,PACKAGED,,150FT,MK 7(ANGIO-ARTERION, 150ML SYR KIT W/QFT,MC)(60729385): Brand: MEDRAD® MARK 7 ARTERION DISPOSABLE SYRINGE 150 ML WITH QUICK FILL TUBE

## (undated) DEVICE — FLOWTRIEVER CATHETER, 18 MM: Brand: FLOWTRIEVER CATHETER, LARGE

## (undated) DEVICE — FLOWSAVER: Brand: FLOWSAVER

## (undated) DEVICE — GOWN,SLEEVE,STERILE,W/CSR WRAP,1/P: Brand: MEDLINE

## (undated) DEVICE — INTRO SHEATH DRYSEAL FLEX 16F 5.3TO6.1MM 33CM

## (undated) DEVICE — CATH IMG IVUS VISIONS .035 DIG 8.2F